# Patient Record
Sex: FEMALE | Race: WHITE | Employment: OTHER | URBAN - NONMETROPOLITAN AREA
[De-identification: names, ages, dates, MRNs, and addresses within clinical notes are randomized per-mention and may not be internally consistent; named-entity substitution may affect disease eponyms.]

---

## 2018-04-11 ENCOUNTER — OFFICE VISIT CONVERTED (OUTPATIENT)
Dept: FAMILY MEDICINE CLINIC | Age: 58
End: 2018-04-11
Attending: NURSE PRACTITIONER

## 2018-04-12 LAB
ALBUMIN SERPL-MCNC: 5 G/DL
ALBUMIN/GLOB SERPL: 1.9 {RATIO}
ALP SERPL-CCNC: 85 IU/L
ALT SERPL-CCNC: 24 IU/L
AST SERPL-CCNC: 28 IU/L
BILIRUB SERPL-MCNC: 0.3 MG/DL
BUN SERPL-MCNC: 11 MG/DL
BUN/CREAT SERPL: 13
CALCIUM SERPL-MCNC: 10 MG/DL
CHLORIDE SERPL-SCNC: 95 MMOL/L
CHOLEST SERPL-MCNC: 255 MG/DL
CO2 SERPL-SCNC: 23 MMOL/L
CONV TOTAL PROTEIN: 7.7 G/DL
CREAT UR-MCNC: 0.88 MG/DL
GLOBULIN UR ELPH-MCNC: 2.7 G/DL
GLUCOSE SERPL-MCNC: 103 MG/DL
HDLC SERPL-MCNC: 73 MG/DL
LDLC SERPL CALC-MCNC: 149 MG/DL
POTASSIUM SERPL-SCNC: 3.7 MMOL/L
SODIUM SERPL-SCNC: 139 MMOL/L
TRIGL SERPL-MCNC: 165 MG/DL
TSH SERPL-ACNC: 2.09 UIU/ML
VLDLC SERPL-MCNC: 33 MG/DL

## 2018-07-16 ENCOUNTER — OFFICE VISIT CONVERTED (OUTPATIENT)
Dept: FAMILY MEDICINE CLINIC | Age: 58
End: 2018-07-16
Attending: NURSE PRACTITIONER

## 2018-07-17 LAB
ALBUMIN SERPL-MCNC: 4.5 G/DL
ALBUMIN/GLOB SERPL: 1.8 {RATIO}
ALP SERPL-CCNC: 79 IU/L
ALT SERPL-CCNC: 15 IU/L
AST SERPL-CCNC: 19 IU/L
BILIRUB SERPL-MCNC: <0.2 MG/DL
BUN SERPL-MCNC: 10 MG/DL
BUN/CREAT SERPL: 12
CALCIUM SERPL-MCNC: 9.8 MG/DL
CHLORIDE SERPL-SCNC: 99 MMOL/L
CHOLEST SERPL-MCNC: 179 MG/DL
CO2 SERPL-SCNC: 25 MMOL/L
CONV TOTAL PROTEIN: 7 G/DL
CREAT UR-MCNC: 0.86 MG/DL
GLOBULIN UR ELPH-MCNC: 2.5 G/DL
GLUCOSE SERPL-MCNC: 97 MG/DL
HDLC SERPL-MCNC: 64 MG/DL
LDLC SERPL CALC-MCNC: 95 MG/DL
POTASSIUM SERPL-SCNC: 3.7 MMOL/L
SODIUM SERPL-SCNC: 140 MMOL/L
TRIGL SERPL-MCNC: 101 MG/DL
TSH SERPL-ACNC: 0.68 UIU/ML
VLDLC SERPL-MCNC: 20 MG/DL

## 2019-06-06 ENCOUNTER — OFFICE VISIT CONVERTED (OUTPATIENT)
Dept: FAMILY MEDICINE CLINIC | Age: 59
End: 2019-06-06
Attending: NURSE PRACTITIONER

## 2019-06-07 LAB
ALBUMIN SERPL-MCNC: 4.7 G/DL
ALBUMIN/GLOB SERPL: 1.7 {RATIO}
ALP SERPL-CCNC: 70 IU/L
ALT SERPL-CCNC: 13 IU/L
AST SERPL-CCNC: 19 IU/L
BILIRUB SERPL-MCNC: 0.3 MG/DL
BUN SERPL-MCNC: 13 MG/DL
BUN/CREAT SERPL: 14
CALCIUM SERPL-MCNC: 9.6 MG/DL
CHLORIDE SERPL-SCNC: 104 MMOL/L
CHOLEST SERPL-MCNC: 189 MG/DL
CO2 SERPL-SCNC: 23 MMOL/L
CONV TOTAL PROTEIN: 7.5 G/DL
CREAT UR-MCNC: 0.91 MG/DL
GLOBULIN UR ELPH-MCNC: 2.8 G/DL
GLUCOSE SERPL-MCNC: 103 MG/DL
HDLC SERPL-MCNC: 59 MG/DL
LDLC SERPL CALC-MCNC: 101 MG/DL
POTASSIUM SERPL-SCNC: 5.1 MMOL/L
SODIUM SERPL-SCNC: 140 MMOL/L
TRIGL SERPL-MCNC: 147 MG/DL
TSH SERPL-ACNC: 0.43 UIU/ML
VLDLC SERPL-MCNC: 29 MG/DL

## 2019-09-11 ENCOUNTER — CONVERSION ENCOUNTER (OUTPATIENT)
Dept: FAMILY MEDICINE CLINIC | Age: 59
End: 2019-09-11

## 2019-09-12 LAB — TSH SERPL-ACNC: 5.62 UIU/ML

## 2019-12-20 ENCOUNTER — CONVERSION ENCOUNTER (OUTPATIENT)
Dept: FAMILY MEDICINE CLINIC | Age: 59
End: 2019-12-20

## 2019-12-21 LAB — TSH SERPL-ACNC: 0.48 UIU/ML

## 2020-01-09 ENCOUNTER — OFFICE VISIT CONVERTED (OUTPATIENT)
Dept: FAMILY MEDICINE CLINIC | Age: 60
End: 2020-01-09
Attending: NURSE PRACTITIONER

## 2020-01-11 LAB
ALBUMIN SERPL-MCNC: 4.9 G/DL
ALBUMIN/GLOB SERPL: 2 {RATIO}
ALP SERPL-CCNC: 79 IU/L
ALT SERPL-CCNC: 15 IU/L
AST SERPL-CCNC: 17 IU/L
BILIRUB SERPL-MCNC: 0.3 MG/DL
BUN SERPL-MCNC: 17 MG/DL
BUN/CREAT SERPL: 19
CALCIUM SERPL-MCNC: 10 MG/DL
CHLORIDE SERPL-SCNC: 102 MMOL/L
CHOLEST SERPL-MCNC: 199 MG/DL
CO2 SERPL-SCNC: 25 MMOL/L
CONV TOTAL PROTEIN: 7.4 G/DL
CREAT UR-MCNC: 0.91 MG/DL
GLOBULIN UR ELPH-MCNC: 2.5 G/DL
GLUCOSE SERPL-MCNC: 105 MG/DL
HDLC SERPL-MCNC: 65 MG/DL
LDLC SERPL CALC-MCNC: 111 MG/DL
POTASSIUM SERPL-SCNC: 4.5 MMOL/L
SODIUM SERPL-SCNC: 140 MMOL/L
TRIGL SERPL-MCNC: 117 MG/DL
VLDLC SERPL-MCNC: 23 MG/DL

## 2020-05-15 ENCOUNTER — CONVERSION ENCOUNTER (OUTPATIENT)
Dept: FAMILY MEDICINE CLINIC | Age: 60
End: 2020-05-15

## 2020-05-16 LAB — TSH SERPL-ACNC: 0.94 UIU/ML

## 2020-07-09 ENCOUNTER — OFFICE VISIT CONVERTED (OUTPATIENT)
Dept: FAMILY MEDICINE CLINIC | Age: 60
End: 2020-07-09
Attending: NURSE PRACTITIONER

## 2020-07-10 LAB
ALBUMIN SERPL-MCNC: 4.5 G/DL
ALBUMIN/GLOB SERPL: 1.8 {RATIO}
ALP SERPL-CCNC: 81 IU/L
ALT SERPL-CCNC: 19 IU/L
AST SERPL-CCNC: 20 IU/L
BILIRUB SERPL-MCNC: <0.2 MG/DL
BUN SERPL-MCNC: 11 MG/DL
BUN/CREAT SERPL: 13
CALCIUM SERPL-MCNC: 9.8 MG/DL
CHLORIDE SERPL-SCNC: 100 MMOL/L
CHOLEST SERPL-MCNC: 208 MG/DL
CO2 SERPL-SCNC: 26 MMOL/L
CONV TOTAL PROTEIN: 7 G/DL
CREAT UR-MCNC: 0.86 MG/DL
GLOBULIN UR ELPH-MCNC: 2.5 G/DL
GLUCOSE SERPL-MCNC: 102 MG/DL
HDLC SERPL-MCNC: 76 MG/DL
LDLC SERPL CALC-MCNC: 109 MG/DL
POTASSIUM SERPL-SCNC: 4.4 MMOL/L
SODIUM SERPL-SCNC: 140 MMOL/L
TRIGL SERPL-MCNC: 117 MG/DL
TSH SERPL-ACNC: 0.46 UIU/ML
VLDLC SERPL-MCNC: 23 MG/DL

## 2020-08-26 ENCOUNTER — OFFICE VISIT CONVERTED (OUTPATIENT)
Dept: OTOLARYNGOLOGY | Facility: CLINIC | Age: 60
End: 2020-08-26
Attending: OTOLARYNGOLOGY

## 2020-10-21 ENCOUNTER — OFFICE VISIT CONVERTED (OUTPATIENT)
Dept: OTOLARYNGOLOGY | Facility: CLINIC | Age: 60
End: 2020-10-21
Attending: OTOLARYNGOLOGY

## 2021-01-04 ENCOUNTER — OFFICE VISIT CONVERTED (OUTPATIENT)
Dept: FAMILY MEDICINE CLINIC | Age: 61
End: 2021-01-04
Attending: NURSE PRACTITIONER

## 2021-01-05 LAB
ALBUMIN SERPL-MCNC: 4.8 G/DL
ALBUMIN/GLOB SERPL: 1.7 {RATIO}
ALP SERPL-CCNC: 84 IU/L
ALT SERPL-CCNC: 29 IU/L
AST SERPL-CCNC: 28 IU/L
BILIRUB SERPL-MCNC: 0.2 MG/DL
BUN SERPL-MCNC: 14 MG/DL
BUN/CREAT SERPL: 16
CALCIUM SERPL-MCNC: 9.7 MG/DL
CHLORIDE SERPL-SCNC: 103 MMOL/L
CHOLEST SERPL-MCNC: 210 MG/DL
CO2 SERPL-SCNC: 24 MMOL/L
CONV TOTAL PROTEIN: 7.6 G/DL
CREAT UR-MCNC: 0.9 MG/DL
GLOBULIN UR ELPH-MCNC: 2.8 G/DL
GLUCOSE SERPL-MCNC: 94 MG/DL
HDLC SERPL-MCNC: 75 MG/DL
LDLC SERPL CALC-MCNC: 113 MG/DL
POTASSIUM SERPL-SCNC: 3.9 MMOL/L
SODIUM SERPL-SCNC: 141 MMOL/L
TRIGL SERPL-MCNC: 128 MG/DL
TSH SERPL-ACNC: 0.85 UIU/ML
VLDLC SERPL-MCNC: 22 MG/DL

## 2021-01-12 ENCOUNTER — HOSPITAL ENCOUNTER (OUTPATIENT)
Dept: OTHER | Facility: HOSPITAL | Age: 61
Discharge: HOME | End: 2021-01-12
Attending: NURSE PRACTITIONER

## 2021-01-12 ENCOUNTER — OFFICE VISIT CONVERTED (OUTPATIENT)
Dept: FAMILY MEDICINE CLINIC | Age: 61
End: 2021-01-12
Attending: NURSE PRACTITIONER

## 2021-01-18 LAB
CONV LAST MENSTURAL PERIOD: NORMAL
SPECIMEN SOURCE: NORMAL
SPECIMEN SOURCE: NORMAL
THIN PREP CVX: NORMAL

## 2021-05-10 NOTE — H&P
History and Physical      Patient Name: Judith Fitzpatrick   Patient ID: [id number]   Sex: Female   YOB: 1960    Primary Care Provider: Linette JO   Referring Provider: Linette JO    Visit Date: August 26, 2020    Provider: Vincenzo Florez MD   Location: ENT - [facility]   Location Address: [address] [place] [address]  [address] [place] [address]  [id number]   Location Phone: [phone]          Chief Complaint     1.  Left ear tinnitus    2.  Hearing loss    3.  Tobacco abuse       History Of Present Illness  Judith Fitzpatrick is a 60 year old /White female who presents to the office today as a consult from Linette JO.      She presents the clinic today for evaluation of issues with her hearing and left ear ringing.  She informs me that she has had high-pitched ear ringing for quite some time, but for the last several months has noted a machinelike ringing that is louder in the left ear.  She denies any other symptoms and has not had any imbalance, vertigo, or significant difficulty in hearing in day-to-day situations.  She denies any family history of hearing loss and has not had any loud noise exposure.    She does smoke about 1 pack/day, and has been a lifelong smoker.  She denies any throat symptoms today.       Past Medical History  Hyperlipidemia; Hypothyroidism; Tinnitus of left ear         Past Surgical History  Dilation and Curettage; Tubal ligation; Uterine ablation         Medication List  Crestor 10 mg oral tablet; hydrochlorothiazide 12.5 mg oral tablet; Levoxyl 100 mcg oral tablet         Allergy List  Lipitor         Family Medical History  Family history of stroke; Family history of heart disease; Family history of diabetes mellitus (DM); Family history of coronary artery disease         Social History  Tobacco (Current every day)         Review of Systems  · Constitutional  o Denies  o : fever, night sweats, weight  "loss  · Eyes  o Denies  o : discharge from eye, impaired vision  · HENT  o Admits  o : *See HPI  · Cardiovascular  o Denies  o : chest pain, irregular heart beats  · Respiratory  o Denies  o : shortness of breath, wheezing, coughing up blood  · Gastrointestinal  o Denies  o : heartburn, reflux, vomiting blood  · Genitourinary  o Denies  o : frequency  · Integument  o Denies  o : rash, skin dryness  · Neurologic  o Denies  o : seizures, loss of balance, loss of consciousness, dizziness  · Endocrine  o Denies  o : cold intolerance, heat intolerance  · Heme-Lymph  o Denies  o : easy bleeding, anemia      Vitals  Date Time BP Position Site L\R Cuff Size HR RR TEMP (F) WT  HT  BMI kg/m2 BSA m2 O2 Sat HC       08/26/2020 01:54 PM        98.5 132lbs 8oz 5'  5\" 22.05 1.66           Physical Examination  · Constitutional  o Appearance  o : well developed, well-nourished, alert and in no acute distress, voice clear and strong  · Head and Face  o Head  o :   § Inspection  § : no deformities or lesions  o Face  o :   § Inspection  § : No facial lesions; House-Brackmann I/VI bilaterally  § Palpation  § : No TMJ crepitus nor  muscle tenderness bilaterally  · Eyes  o Vision  o :   § Visual Fields  § : Extraocular movements are intact. No spontaneous or gaze-induced nystagmus.  o Conjunctivae  o : clear  o Sclerae  o : clear  o Pupils and Irises  o : pupils equal, round, and reactive to light.   · Ears, Nose, Mouth and Throat  o Ears  o :   § External Ears  § : appearance within normal limits, no lesions present  § Otoscopic Examination  § : tympanic membrane appearance within normal limits bilaterally without perforations, well-aerated middle ears  § Hearing  § : intact to conversational voice both ears  o Nose  o :   § External Nose  § : appearance normal  § Intranasal Exam  § : mucosa within normal limits, vestibules normal, no intranasal lesions present, septum midline, sinuses non tender to percussion  o Oral " Cavity  o :   § Oral Mucosa  § : oral mucosa normal without pallor or cyanosis  § Lips  § : lip appearance normal  § Teeth  § : normal dentition for age  § Gums  § : gums pink, non-swollen, no bleeding present  § Tongue  § : tongue appearance normal; normal mobility  § Palate  § : hard palate normal, soft palate appearance normal with symmetric mobility  o Throat  o :   § Oropharynx  § : no inflammation or lesions present, tonsils within normal limits  § Hypopharynx  § : appearance within normal limits, superior epiglottis within normal limits  § Larynx  § : appearance within normal limits, vocal cords within normal limits, no lesions present  · Neck  o Inspection/Palpation  o : normal appearance, no masses or tenderness, trachea midline; thyroid size normal, nontender, no nodules or masses present on palpation  · Respiratory  o Respiratory Effort  o : breathing unlabored  o Inspection of Chest  o : normal appearance, no retractions  · Cardiovascular  o Heart  o : regular rate and rhythm  · Lymphatic  o Neck  o : no lymphadenopathy present  o Supraclavicular Nodes  o : no lymphadenopathy present  o Preauricular Nodes  o : no lymphadenopathy present  · Skin and Subcutaneous Tissue  o General Inspection  o : Regarding face and neck - there are no rashes present, no lesions present, and no areas of discoloration  · Neurologic  o Cranial Nerves  o : cranial nerves II-XII are grossly intact bilaterally  o Gait and Station  o : normal gait, able to stand without diffculty  · Psychiatric  o Judgement and Insight  o : judgment and insight intact  o Mood and Affect  o : mood normal, affect appropriate          Assessment  · Tobacco abuse     305.1/Z72.0  · Hearing loss     389.9/H91.90  · Tinnitus     388.30/H93.19    Problems Reconciled  Plan  · Orders  o Smoking cessation counseling, 3-10 minutes [facility] ([id number]) - 305.1/Z72.0 - 08/26/2020  o Audiometry, pure-tone (threshold); air and bone (31899) - 389.9/H91.90, 388.30/H93.19  - 08/26/2020  o Tympanogram (Impedance Testing) [facility] ([id number]) - 389.9/H91.90, 388.30/H93.19 - 08/26/2020  · Medications  o Medications have been Reconciled  o Transition of Care or Provider Policy  · Instructions  o Tobacco and smoking cessation counseling for more than 3 minutes was completed.  o She presents the clinic today for evaluation of issues with her hearing and left ear ringing. She informs me that she has had high-pitched ear ringing for quite some time, but for the last several months has noted a machinelike ringing that is louder in the left ear. She denies any other symptoms and has not had any imbalance, vertigo, or significant difficulty in hearing in day-to-day situations. She denies any family history of hearing loss and has not had any loud noise exposure.She does smoke about 1 pack/day, and has been a lifelong smoker. She denies any throat symptoms today. On examination today, she has narrow ear canals but the eardrums appear normal on both sides. I will plan on obtaining an audiogram and see her back in 6 weeks to discuss the results and any further management for this. I did have a lengthy discussion with her about the importance of quitting smoking and the relation between smoking and had a neck as well as other kind of cancers and vascular issues. She states that she understands, and will try.  o Electronically Identified Patient Education Materials Provided Electronically  · Correspondence  o ENT Letter to Referring MD (Linette JO) - 08/26/2020            Electronically Signed by: Vincenzo Florez MD -Author on August 26, 2020 02:12:41 PM

## 2021-05-13 NOTE — PROGRESS NOTES
Progress Note      Patient Name: Judith Fitzpatrick   Patient ID: [id number]   Sex: Female   YOB: 1960    Primary Care Provider: Linette JO   Referring Provider: Linette JO    Visit Date: October 21, 2020    Provider: Vincenzo Florez MD   Location: [facility]   Location Address: [address] [place] [address]  [address] [place] [address]  [id number]   Location Phone: [phone]          Chief Complaint     1.  Hearing loss    2.  Tinnitus    3.  Tobacco abuse       History Of Present Illness  Judith Fitzpatrick is a 60 year old /White female who presents to the office today for a follow-up visit.      She presents the clinic today for follow-up regarding hearing loss and tinnitus.  I last saw her for this in August, at which point she was describing a machinelike sound, or whooshing sound that seems to be louder in the left ear.  She has had no significant changes in her symptoms.  She feels like she hears well most of the time in day-to-day situations, but does have some issues.  She did have an audiogram performed which shows normal hearing with mild to moderate sensorineural hearing loss above 1000 Hz which is roughly symmetric in both ears.  Tympanogram shows negative pressure, slight.  Word discrimination scores were 100% on the right and 96% on the left.    She continues to smoke, but is trying to cut down, we discussed this today.       Past Medical History  Hyperlipidemia; Hypothyroidism; Tinnitus of left ear         Past Surgical History  Dilation and Curettage; Tubal ligation; Uterine ablation         Medication List  Crestor 10 mg oral tablet; hydrochlorothiazide 12.5 mg oral tablet; Levoxyl 100 mcg oral tablet         Allergy List  Lipitor       Allergies Reconciled  Family Medical History  Family history of stroke; Family history of heart disease; Family history of diabetes mellitus (DM); Family history of coronary artery disease  "        Social History  Tobacco (Current every day)         Review of Systems  · Constitutional  o Denies  o : fever, night sweats, weight loss  · Eyes  o Denies  o : discharge from eye, impaired vision  · HENT  o Admits  o : *See HPI  · Cardiovascular  o Denies  o : chest pain, irregular heart beats  · Respiratory  o Denies  o : shortness of breath, wheezing, coughing up blood  · Gastrointestinal  o Denies  o : heartburn, reflux, vomiting blood  · Genitourinary  o Denies  o : frequency  · Integument  o Denies  o : rash, skin dryness  · Neurologic  o Denies  o : seizures, loss of balance, loss of consciousness, dizziness  · Endocrine  o Denies  o : cold intolerance, heat intolerance  · Heme-Lymph  o Denies  o : easy bleeding, anemia      Vitals  Date Time BP Position Site L\R Cuff Size HR RR TEMP (F) WT  HT  BMI kg/m2 BSA m2 O2 Sat FR L/min FiO2        10/21/2020 09:27 AM        97 135lbs 0oz 5'  5\" 22.46 1.68             Physical Examination  · Constitutional  o Appearance  o : well developed, well-nourished, alert and in no acute distress, voice clear and strong  · Head and Face  o Head  o :   § Inspection  § : no deformities or lesions  o Face  o :   § Inspection  § : No facial lesions; House-Brackmann I/VI bilaterally  § Palpation  § : No TMJ crepitus nor  muscle tenderness bilaterally  · Eyes  o Vision  o :   § Visual Fields  § : Extraocular movements are intact. No spontaneous or gaze-induced nystagmus.  o Conjunctivae  o : clear  o Sclerae  o : clear  o Pupils and Irises  o : pupils equal, round, and reactive to light.   · Ears, Nose, Mouth and Throat  o Ears  o :   § External Ears  § : appearance within normal limits, no lesions present  § Otoscopic Examination  § : tympanic membrane appearance within normal limits bilaterally without perforations, well-aerated middle ears  § Hearing  § : intact to conversational voice both ears  o Nose  o :   § External Nose  § : appearance normal  § Intranasal " Exam  § : mucosa within normal limits, vestibules normal, no intranasal lesions present, septum midline, sinuses non tender to percussion  o Oral Cavity  o :   § Oral Mucosa  § : oral mucosa normal without pallor or cyanosis  § Lips  § : lip appearance normal  § Teeth  § : normal dentition for age  § Gums  § : gums pink, non-swollen, no bleeding present  § Tongue  § : tongue appearance normal; normal mobility  § Palate  § : hard palate normal, soft palate appearance normal with symmetric mobility  o Throat  o :   § Oropharynx  § : no inflammation or lesions present, tonsils within normal limits  § Hypopharynx  § : appearance within normal limits, superior epiglottis within normal limits  § Larynx  § : appearance within normal limits, vocal cords within normal limits, no lesions present  · Neck  o Inspection/Palpation  o : normal appearance, no masses or tenderness, trachea midline; thyroid size normal, nontender, no nodules or masses present on palpation  · Respiratory  o Respiratory Effort  o : breathing unlabored  o Inspection of Chest  o : normal appearance, no retractions  · Cardiovascular  o Heart  o : regular rate and rhythm  · Lymphatic  o Neck  o : no lymphadenopathy present  o Supraclavicular Nodes  o : no lymphadenopathy present  o Preauricular Nodes  o : no lymphadenopathy present  · Skin and Subcutaneous Tissue  o General Inspection  o : Regarding face and neck - there are no rashes present, no lesions present, and no areas of discoloration  · Neurologic  o Cranial Nerves  o : cranial nerves II-XII are grossly intact bilaterally  o Gait and Station  o : normal gait, able to stand without diffculty  · Psychiatric  o Judgement and Insight  o : judgment and insight intact  o Mood and Affect  o : mood normal, affect appropriate          Assessment  · Tobacco abuse     305.1/Z72.0  · Hearing loss     389.9/H91.90  · Tinnitus     388.30/H93.19      Plan  · Orders  o Smoking cessation counseling, 3-10 minutes  [facility] ([id number]) - 305.1/Z72.0 - 10/21/2020  o Audiometry, pure-tone (threshold); air and bone (22150) - 389.9/H91.90, 388.30/H93.19 - 10/21/2021  o Tympanogram (Impedance Testing) [facility] ([id number]) - 389.9/H91.90, 388.30/H93.19 - 10/21/2021  · Medications  o Medications have been Reconciled  o Transition of Care or Provider Policy  · Instructions  o Tobacco and smoking cessation counseling for more than 3 minutes was completed.  o She presents the clinic today for follow-up regarding hearing loss and tinnitus. I last saw her for this in August, at which point she was describing a machinelike sound, or whooshing sound that seems to be louder in the left ear. She has had no significant changes in her symptoms. She feels like she hears well most of the time in day-to-day situations, but does have some issues. She did have an audiogram performed which shows normal hearing with mild to moderate sensorineural hearing loss above 1000 Hz which is roughly symmetric in both ears. Tympanogram shows negative pressure, slight. Word discrimination scores were 100% on the right and 96% on the left. On examination, her ears appear stable, normal. We discussed treatment options including melatonin 3 mg nightly, as well as background noise distraction techniques. I would like to retest her hearing in 1 year, or sooner should there be any issues.She continues to smoke, but is trying to cut down, we discussed this today. I will see her back in 1 year after the audiogram, or sooner if she has any changes in her symptoms.   o Electronically Identified Patient Education Materials Provided Electronically  · Correspondence  o ENT Letter to Referring MD (Linette JO) - 10/21/2020            Electronically Signed by: Vincenzo Florez MD -Author on October 21, 2020 09:37:33 AM

## 2021-05-14 VITALS — TEMPERATURE: 97 F | WEIGHT: 135 LBS | BODY MASS INDEX: 22.49 KG/M2 | HEIGHT: 65 IN

## 2021-05-14 VITALS — WEIGHT: 132.5 LBS | BODY MASS INDEX: 22.08 KG/M2 | TEMPERATURE: 98.5 F | HEIGHT: 65 IN

## 2021-05-18 NOTE — PROGRESS NOTES
Judith FitzpatrickJoshua 1960     Office/Outpatient Visit    Visit Date: Wed, Apr 11, 2018 08:47 am    Provider: Linette Tracey N.P. (Assistant: Romana Jane MA)    Location: [facility]        Electronically signed by Linette Tracey N.P. on  04/11/2018 12:06:48 PM                             SUBJECTIVE:        CC:     Ms. Fitzpatrick is a 57 year old White female.  This is a follow-up visit.  med refills;         HPI:         Ms. Fitzpatrick presents with hypertension.  Her current cardiac medication regimen includes a diuretic ( hctz ).  Ms. Fitzpatrick does not check her blood pressure other than at her clinic appointments.  Compliance with treatment has been good; she takes her medication as directed and follows up as directed.          In regard to the high cholesterol, current treatment includes Pravachol.  Compliance with treatment has been good; she takes her medication as directed and follows up as directed.  Most recent lab tests include Total Cholesterol:  226 (mg/dL) (11/27/2017), HDL:  59 (mg/dL) (11/27/2017), Triglycerides:  224 (mg/dL) (11/27/2017), LDL Cholesterol:  122 (mg/dL) (11/27/2017).          Additionally, she presents with history of acquired hypothyroidism, other specified cause.  she is currently taking Levoxyl, 10 mcg daily.  TSH was last checked 5 months ago.  The result was reported as normal ( 1.87 mU/L ).      ROS:     CONSTITUTIONAL:  Negative for chills, fatigue, fever, and weight change.      CARDIOVASCULAR:  Negative for chest pain, palpitations, tachycardia, orthopnea, and edema.      RESPIRATORY:  Negative for cough, dyspnea, and hemoptysis.      INTEGUMENTARY/BREAST:  Positive for suspicous mole (  states she has some abnormal areas on back ) and skin tags.      NEUROLOGICAL:  Negative for dizziness, headaches, paresthesias, and weakness.          PMH/FMH/SH:     Last Reviewed on 4/11/2018 09:23 AM by Linette Tracey    Past Medical History:             GYNECOLOGICAL  HISTORY:             PREVENTIVE HEALTH MAINTENANCE             Hepatitis C Medicare Screening: was last done 17; negative         Surgical History:     Surgeries:    Biopsy of breast; benign;       Dilation and Curettage    Bilateral Tubal Ligation    BENIGN TUMOR REMOVED FROM NECK;    uterine ablation;     Procedures:    Colonoscopy (  ) heart cath normal 14         Family History:         Positive for Coronary Artery Disease and Hypertension.      Positive for Type 2 Diabetes and Hypothyroidism.  Father:  at age 70's; Cause of death was stomach cancer;  Hypertension;  Type 2 Diabetes     Mother: Hypertension; Hyperlipidemia         Social History:     Occupation: Retired (Prior occupation: [employer] )     Marital Status:      Children: 2 children, 3 step-children, and (2 grandchildren) step children         Tobacco/Alcohol/Supplements:     Last Reviewed on 2018 09:23 AM by Linette Tracey    Tobacco: Current Smoker: She currently smokes every day, 1 pack per day.  Non-drinker     Caffeine:  She admits to consuming caffeine via coffee ( 10 servings per day ).              Immunizations:     zzFluzone pf-quadrivalent 3 and up 2015     Fluzone (3 + years dose) 2010     Adacel (Tdap) 2009         Allergies:     Last Reviewed on 2018 09:23 AM by Linette Tracey      No Known Drug Allergies.     Lipitor: nausea (Adverse Reaction)        Current Medications:     Last Reviewed on 2018 09:23 AM by Linette Tracey    Hydrochlorothiazide (HCTZ) 25mg Tablet one a day     Levoxyl 0.1mg Tablet Take 1 tablet(s) by mouth daily     Pravachol 40mg Tablet take 1 tablet daily at bedtime         OBJECTIVE:        Vitals:         Current: 2018 8:49:31 AM    Ht:  5 ft, 5 in;  Wt: 132.7 lbs;  BMI: 22.1    T: 97.6 F (oral);  BP: 147/100 mm Hg (left arm, sitting);  P: 95 bpm (left arm (BP Cuff), sitting);  sCr: 0.75 mg/dL;  GFR: 78.84        Repeat:     8:52:14  AM     BP:   138/96mm Hg (left arm, sitting)     9:33:08 AM     BP:   154/98mm Hg (left arm, sitting)         Exams:     PHYSICAL EXAM:     GENERAL: vital signs recorded - well developed, well nourished;  no apparent distress;     NECK: carotid exam reveals no bruits;     RESPIRATORY: normal respiratory rate and pattern with no distress; normal breath sounds with no rales, rhonchi, wheezes or rubs;     CARDIOVASCULAR: normal rate; rhythm is regular;  no systolic murmur; no edema;     BREAST/INTEGUMENT: seborrheic keratoses; two skin tags on right lower back; multiple moles on back;     PSYCHIATRIC:  appropriate affect and demeanor; normal speech pattern; grossly normal memory;         ASSESSMENT           401.1   I10  Hypertension              DDx:     272.0   E78.2  High cholesterol              DDx:     244.8   E03.8  Acquired hypothyroidism, other specified cause              DDx:     701.9   L91.8  Skin tag              DDx:         ORDERS:         Meds Prescribed:       Refill of: Hydrochlorothiazide (HCTZ) 25mg Tablet one a day  #90 (Ninety) tablet(s) Refills: 1       Refill of: Levoxyl (Levothyroxine Sodium) 0.1mg Tablet Take 1 tablet(s) by mouth daily  #90 (Ninety) tablet(s) Refills: 1         Lab Orders:       [id number]  [id number] [facility] Comp Metabolic Panel (14)  (Send-Out)         [id number]  [id number] [facility] Lipid Panel (Excludes LDL/HDL ratio)  (Send-Out)         [id number]  [id number] [facility] TSH  (Send-Out)           Procedures Ordered:       REFER  Referral to Specialist or Other Facility  (Send-Out)                   PLAN:          Hypertension have her bp rechecked     LABORATORY:  Labs ordered to be performed today at [facility] include.  CMP     RECOMMENDATIONS given include: perform routine monitoring of blood pressure with home blood pressure cuff, exercise, and smoking cessation.      FOLLOW-UP: Instructed to call if new or worsening symptoms develop. Schedule a follow-up visit in 6 months.            Prescriptions:        Refill of: Hydrochlorothiazide (HCTZ) 25mg Tablet one a day  #90 (Ninety) tablet(s) Refills: 1           Orders:       [id number]  [id number] [facility] Comp Metabolic Panel (14)  (Send-Out)            High cholesterol Reviewed labs and discussed possibility of changing to a different anti-lipid if lipid panel is still elevated.     LABORATORY:  Labs ordered to be performed today at [facility] include.  Lipid panel     RECOMMENDATIONS given include: smoking cessation, exercise, and low cholesterol/low fat diet.      FOLLOW-UP: Schedule a follow-up visit in 6 months.            Orders:       [id number]  [id number] [facility] Lipid Panel (Excludes LDL/HDL ratio)  (Send-Out)            Acquired hypothyroidism, other specified cause Reviewed labs     LABORATORY:  Labs ordered to be performed today at [facility] include.  TSH     FOLLOW-UP: Schedule a follow-up visit in 6 months.            Prescriptions:       Refill of: Levoxyl (Levothyroxine Sodium) 0.1mg Tablet Take 1 tablet(s) by mouth daily  #90 (Ninety) tablet(s) Refills: 1           Orders:       [id number]  [id number] [facility] TSH  (Send-Out)            Skin tag Will refer to Dr Farooq with dermatology to do a head to toe skin check. Educated pt on importance of yearly mammogram and pap smear. Instructed to call and schedule a well woman exam if desired.         REFERRALS:  Referral initiated to a dermatologist ( Dr. Patricia Farooq; for evaluation of skin evaluation ).            Orders:       REFER  Referral to Specialist or Other Facility  (Send-Out)               Patient Recommendations:        For  Hypertension:     Begin monitoring your blood pressure by brief nurse visits at our office, a home blood pressure monitor, or by checking on the machines in pharmacies or stores.  Keep a log of the readings. Maintain a regular exercise program. Stop smoking!  Call the office if you develop adverse reactions to your medication, such as dizziness, fainting, swelling in the legs, extreme fatigue, or  any other symptoms that concern you. Schedule a follow-up visit in 6 months.          For  High cholesterol:     Stop smoking! Maintain a regular exercise program. Reduce the amount of cholesterol and saturated fat in your diet.  Schedule a follow-up visit in 6 months.          For  Acquired hypothyroidism, other specified cause:     Schedule a follow-up visit in 6 months.              CHARGE CAPTURE           **Please note: ICD descriptions below are intended for billing purposes only and may not represent clinical diagnoses**        Primary Diagnosis:         401.1 Hypertension            I10    Essential (primary) hypertension              Orders:          00890   Office/outpatient visit; established patient, level 4  (In-House)           272.0 High cholesterol            E78.2    Mixed hyperlipidemia    244.8 Acquired hypothyroidism, other specified cause            E03.8    Other specified hypothyroidism    701.9 Skin tag            L91.8    Other hypertrophic disorders of the skin

## 2021-05-18 NOTE — PROGRESS NOTES
Judith Fitzpatrick  1960     Office/Outpatient Visit    Visit Date:  01:01 pm    Provider: Linette Tracey N.P. (Assistant: Sophie Holland MA)    Location: [facility]        Electronically signed by Linette Tracey N.P. on  2021 03:33:06 PM                             Subjective:        CC: Ms. Fitzpatrick is a 60 year old White female.  She presents with physical.  She is here for an annual exam.          HPI:           Dx with encounter for gynecological examination (general) (routine) without abnormal findings; she cannot recall when she last had a physical exam.  She is menopausal.  She does not perform breast self-exams.   Her last Pap smear was several years ago and was abnormal, but no details are known.   Her last mammogram was 2 years ago.   Preventative Health updated today.            PHQ-9 Depression Screening: Completed form scanned and in chart; Total Score 0     ROS:     CONSTITUTIONAL:  Negative for chills and fever.      EYES:  Negative for blurred vision.      E/N/T:  Negative for diminished hearing and nasal congestion.      CARDIOVASCULAR:  Negative for chest pain and palpitations.      RESPIRATORY:  Negative for recent cough and dyspnea.      GASTROINTESTINAL:  Negative for abdominal pain, melena, nausea and vomiting.      GENITOURINARY:  Positive for pelvic pain / intermittently, not associated with intercourse.   Negative for dysuria or hematuria.      MUSCULOSKELETAL:  Negative for arthralgias and myalgias.      INTEGUMENTARY/BREAST:  Negative for atypical mole(s) and rash.      NEUROLOGICAL:  Negative for paresthesias and weakness.      PSYCHIATRIC:  Negative for anxiety and depression.          Past Medical History / Family History / Social History:         Last Reviewed on 2021 09:32 AM by Linette Tracey    Past Medical History:             GYNECOLOGICAL HISTORY:             PREVENTIVE HEALTH MAINTENANCE             Hepatitis C  Medicare Screening: was last done 17; negative         Surgical History:     Surgeries:    Biopsy of breast; benign;      Dilation and Curettage    Bilateral Tubal Ligation    BENIGN TUMOR REMOVED FROM NECK;    uterine ablation;     Procedures:    Colonoscopy (  ) heart cath normal 14         Family History:         Positive for Coronary Artery Disease and Hypertension.      Positive for Type 2 Diabetes and Hypothyroidism.  Father:  at age 70's; Cause of death was stomach cancer;  Hypertension;  Type 2 Diabetes     Mother: Hypertension; Hyperlipidemia         Social History:     Occupation: Retired (Prior occupation: [employer] )     Marital Status:      Children: 2 children, 3 step-children, and (2 grandchildren) step children (living with her)         Tobacco/Alcohol/Supplements:     Last Reviewed on 2021 08:53 AM by nAgelita Higuera    Tobacco: Current Smoker: She currently smokes every day, 1/2 to 1 pack per day.  Non-drinker     Caffeine:  She admits to consuming caffeine via coffee ( 10 servings per day ).          Immunizations:     influenza, injectable, quadrivalent, preservative free (FLUZONE QUAD 7474-7772) 2021    zzFluzone pf-quadrivalent 3 and up 2015    Fluzone (3 + years dose) 2010    Adacel (Tdap) 2009        Allergies:     Last Reviewed on 2021 01:08 PM by Sophie Holland    Lipitor: Nausea  (Adverse Reaction)        Current Medications:     Last Reviewed on 2021 01:08 PM by Sophie Holland    Levoxyl 100 mcg oral tablet [Take 1 tablet(s) by mouth daily]    hydroCHLOROthiazide 12.5 mg oral tablet [Take 1 tablet by mouth once daily]    Crestor 10 mg oral tablet [Take 1 tablet(s) by mouth daily  at bedtime]        Objective:        Vitals:         Current: 2021 1:11:54 PM    Ht:  5 ft, 5 in;  Wt: 138 lbs;  BMI: 23.0T: 97.7 F (temporal);  BP: 148/85 mm Hg (right arm, sitting);  P: 73 bpm (right arm (BP Cuff), sitting);   sCr: 0.9 mg/dL;  GFR: 64.46        Exams:     PHYSICAL EXAM:     GENERAL: vital signs recorded - well developed, well nourished;  no apparent distress;     EYES: extraocular movements intact; conjunctiva and cornea are normal; PERRLA;     E/N/T:  normal EACs, TMs, nasal/oral mucosa, teeth, gingiva, and oropharynx;     NECK: range of motion is normal; thyroid is non-palpable;     RESPIRATORY: normal respiratory rate and pattern with no distress; normal breath sounds with no rales, rhonchi, wheezes or rubs;     CARDIOVASCULAR: normal rate; rhythm is regular;  no systolic murmur; no edema;     GASTROINTESTINAL: nontender; normal bowel sounds; no organomegaly; rectal exam: normal tone; nontender, guaiac negative stool;     GENITOURINARY: Pap smear taken;  external genitalia: normal without lesions or urethral abnormalities;; vagina: atrophic mucosa; ;  cervix: normal appearance without lesions or discharge;;  uterus: normal size and position, well-supported;;  adnexa: normal with no masses or tenderness     LYMPHATIC: no enlargement of cervical or facial nodes; no axillary adenopathy;     BREAST/INTEGUMENT: BREASTS: breast exam is normal without masses, skin changes, or nipple discharge; SKIN: no significant rashes or lesions; no suspicious moles;     MUSCULOSKELETAL:  Normal range of motion, strength and tone;     NEUROLOGIC: GROSSLY INTACT     PSYCHIATRIC:  appropriate affect and demeanor; normal speech pattern; grossly normal memory;         Lab/Test Results:         Glucose, Urine: Neg (01/12/2021),     Bilirubin, urine: Negative (01/12/2021),     Ketones, Urine Strip: Negative (01/12/2021),     Specific Gravity, urine: 1.015 (01/12/2021),     Blood in Urine: negative (01/12/2021),     pH, urine: 7.0 (01/12/2021),     Protein Urine QL: negative (01/12/2021),     Urobilinogen, urine: 0.2 E.U./dL (01/12/2021),     Nitrite, Urine: Negative (01/12/2021),     Leukoctyes, urine: Small (01/12/2021),     Appearance:  Slightly Cloudy (01/12/2021),     collection source: Clean-catch (01/12/2021),     Color: Yellow (01/12/2021),     Performed by:: [email or web address] (01/12/2021),             Assessment:         Z01.419   Encounter for gynecological examination (general) (routine) without abnormal findings       Z13.31   Encounter for screening for depression       Z12.12   Encounter for screening for malignant neoplasm of rectum       I10   Essential (primary) hypertension           ORDERS:         Radiology/Test Orders:       [id number]  Screening digital breast tomosynthesis bi  (Send-Out)              Lab Orders:       [id number]  Urinalysis, automated, without microscopy  (In-House)            92445  Occult blood, fecal  (In-House)              Procedures Ordered:       [id number]  Handlg&/or convey of spec for TR office to lab  (In-House)            63349  Cytopathology, cervix/vagina collect in preservative, auto thin layer prep, rakesh w/MD supervision  (Send-Out)              Other Orders:         Depression screen negative  (In-House)                      Plan:         Encounter for gynecological examination (general) (routine) without abnormal findingsreviewed pap from 5-, normal /urine dip okay        RADIOLOGY:  I have ordered Mammogram Bilateral Screening 3D to be done today.      COUNSELING was provided today regarding the following topics: breast self-exam.      FOLLOW-UP: pending pap smear results           Orders:       60174  Handlg&/or convey of spec for TR office to lab  (In-House)            11172  Urinalysis, automated, without microscopy  (In-House)            74463  Screening digital breast tomosynthesis bi  (Send-Out)            02483  Cytopathology, cervix/vagina collect in preservative, auto thin layer prep, scrn w/MD supervision  (Send-Out)              Encounter for screening for depression    MIPS PHQ-9 Depression Screening: Completed form scanned and in chart; Total Score 0; Negative Depression Screen           Orders:          Depression screen negative  (In-House)              Encounter for screening for malignant neoplasm of rectumget a copy of last colonoscopy          Orders:       65275  Occult blood, fecal  (In-House)      [place]  [facility] [place]ential (primary) hypertensionreviewed recent labs         RECOMMENDATIONS given include: perform routine monitoring of blood pressure with home blood pressure cuff.              Patient Recommendations:        For  Encounter for gynecological examination (general) (routine) without abnormal findings:        You should regularly examine your breasts, easily done while in the shower or with lotion.  Feel and look for differences in consistency from month to month, especially noting knots or lumps, changes in skin appearance, nipple retraction or discharge.          For  Essential (primary) hypertension:    Begin monitoring your blood pressure by brief nurse visits at our office, a home blood pressure monitor, or by checking on the machines in pharmacies or stores.  Keep a log of the readings.              Charge Capture:         Primary Diagnosis:     Z01.419  Encounter for gynecological examination (general) (routine) without abnormal findings           Orders:      82192  Preventive medicine, established patient, age 40-64 years  (In-House)            27092  Handlg&/or convey of spec for TR office to lab  (In-House)            36514  Urinalysis, automated, without microscopy  (In-House)              Z13.31  Encounter for screening for depression           Orders:        Depression screen negative  (In-House)              Z12.12  Encounter for screening for malignant neoplasm of rectum           Orders:      03957  Occult blood, fecal  (In-House)              I10  Essential (primary) hypertension

## 2021-05-18 NOTE — PROGRESS NOTES
Judith Fitzpatrick  1960     Office/Outpatient Visit    Visit Date: Thu, Jul 9, 2020 09:37 am    Provider: Linette Tracey N.P. (Assistant: Kiara Davalos MA)    Location: [facility]        Electronically signed by Linette Tracey N.P. on  07/09/2020 10:53:31 AM                             Subjective:        CC: Ms. Fitzpatrick is a 60 year old White female.  This is a follow-up visit.  CHECK UP;         HPI:           Patient presents with other specified hypothyroidism.  She is currently taking Levoxyl, 100 mcg daily.  TSH was last checked two months ago.  The result was reported as normal.            Dx with mixed hyperlipidemia; current treatment includes Crestor.  Compliance with treatment has been good; she takes her medication as directed.  She denies experiencing any hypercholesterolemia related symptoms.  Most recent lab tests include Glucose, Serum:  105 (mg/dL) (01/10/2020), ALT (SGPT):  15 (IU/L) (01/10/2020), AST (SGOT):  17 (IU/L) (01/10/2020), Systolic BP:  156 (07/09/2020), Diastolic BP:  92 (07/09/2020), TSH:  0.939 (uIU/mL) (05/15/2020), Total Cholesterol:  199 (mg/dL) (01/10/2020), HDL:  65 (mg/dL) (01/10/2020), Triglycerides:  117 (mg/dL) (01/10/2020), LDL Cholesterol:  111 (mg/dL) (01/10/2020).            Dx with essential (primary) hypertension; her current cardiac medication regimen includes a diuretic ( Hydrochlorothiazide (HCTZ) ).  Ms. Fitzpatrick does not check her blood pressure other than at her clinic appointments.  She is tolerating the medication well without side effects.  Compliance with treatment has been good; she takes her medication as directed.      ROS:     CONSTITUTIONAL:  Negative for fever.      E/N/T:  Positive for tinnitus left.  since Mach 2020    CARDIOVASCULAR:  Negative for chest pain, palpitations, tachycardia, orthopnea, and edema.      RESPIRATORY:  Negative for recent cough and dyspnea.      NEUROLOGICAL:  Negative for dizziness, headaches,  paresthesias, and weakness.          Past Medical History / Family History / Social History:         Last Reviewed on 2020 09:49 AM by Linette Tracey    Past Medical History:             GYNECOLOGICAL HISTORY:             PREVENTIVE HEALTH MAINTENANCE             Hepatitis C Medicare Screening: was last done 17; negative         Surgical History:     Surgeries:    Biopsy of breast; benign; 2004     Dilation and Curettage    Bilateral Tubal Ligation    BENIGN TUMOR REMOVED FROM NECK;    uterine ablation;     Procedures:    Colonoscopy (  ) heart cath normal 14         Family History:         Positive for Coronary Artery Disease and Hypertension.      Positive for Type 2 Diabetes and Hypothyroidism.  Father:  at age 70's; Cause of death was stomach cancer;  Hypertension;  Type 2 Diabetes     Mother: Hypertension; Hyperlipidemia         Social History:     Occupation: Retired (Prior occupation: [employer] )     Marital Status:      Children: 2 children, 3 step-children, and (2 grandchildren) step children (living with her)         Tobacco/Alcohol/Supplements:     Last Reviewed on 2020 11:14 AM by Romana Jane    Tobacco: Current Smoker: She currently smokes every day, 1 pack per day.  Non-drinker     Caffeine:  She admits to consuming caffeine via coffee ( 10 servings per day ).          Immunizations:     zzFluzone pf-quadrivalent 3 and up 2015    Fluzone (3 + years dose) 2010    Adacel (Tdap) 2009        Allergies:     Last Reviewed on 2020 09:43 AM by Kiara Davalos    Lipitor: Nausea  (Adverse Reaction)        Current Medications:     Last Reviewed on 2020 11:14 AM by Romana Jane    Levoxyl 100 mcg oral tablet [Take 1 tablet(s) by mouth daily]    hydroCHLOROthiazide 12.5 mg oral tablet [Take 1 tablet by mouth once daily]    Crestor 10 mg oral tablet [Take 1 tablet(s) by mouth daily  at bedtime]        Objective:        Vitals:          Current: 7/9/2020 9:42:55 AM    Ht:  5 ft, 5 in;  Wt: 133.6 lbs;  BMI: 22.2T: 97.3 F (temporal);  BP: 156/92 mm Hg (left arm, sitting);  P: 65 bpm (left arm (BP Cuff), sitting);  sCr: 0.91 mg/dL;  GFR: 62.88        Repeat:     10:3:35 AM  BP:   163/81mm Hg (left arm, sitting, HR: 61)     Exams:     PHYSICAL EXAM:     GENERAL: vital signs recorded - well developed, well nourished;  no apparent distress;     E/N/T: EARS: external auditory canal occluded by cerumen on the right;  left TM is normal; partial occluded on left    NECK: carotid exam reveals no bruits;     RESPIRATORY: normal respiratory rate and pattern with no distress; normal breath sounds with no rales, rhonchi, wheezes or rubs;     CARDIOVASCULAR: normal rate; rhythm is regular;  no systolic murmur; no edema;     PSYCHIATRIC:  appropriate affect and demeanor; normal speech pattern; grossly normal memory;         Assessment:         E03.8   Other specified hypothyroidism       E78.2   Mixed hyperlipidemia       I10   Essential (primary) hypertension       H93.12   Tinnitus, left ear           ORDERS:         Meds Prescribed:       [Refilled] Levoxyl 100 mcg oral tablet [Take 1 tablet(s) by mouth daily], #90 (ninety) tablets, Refills: 0 (zero)       [Refilled] Crestor 10 mg oral tablet [Take 1 tablet(s) by mouth daily  at bedtime], #90 (ninety) tablets, Refills: 1 (one)       [Refilled] hydroCHLOROthiazide 12.5 mg oral tablet [Take 1 tablet by mouth once daily], #90 (ninety) tablets, Refills: 0 (zero)         Lab Orders:       [id number]  [id number] [facility] TSH  (Send-Out)            [id number] [facility] Comp Metabolic Panel (14)  (Send-Out)            [id number]  [id number] [facility] Lipid Panel (Excludes LDL/HDL ratio)  (Send-Out)              Procedures Ordered:       REFER  Referral to Specialist or Other Facility  (Send-Out)                      Plan:         Other specified hypothyroidismhad coffee with cream and sugar this am/she [facility]    LABORATORY:  Labs ordered to be performed today at [facility] include.  TSH           Prescriptions:       [Refilled] Levoxyl 100 mcg oral tablet [Take 1 tablet(s) by mouth daily], #90 (ninety) tablets, Refills: 0 (zero)           Orders:       [id number]  [id number] [facility]  ([facility])              Mixed hyperlipidemia    LABORATORY:  Labs ordered to be performed today at [facility] include.  CMP Lipid panel           Prescriptions:       [Refilled] Crestor 10 mg oral tablet [Take 1 tablet(s) by mouth daily  at bedtime], #90 (ninety) tablets, Refills: 1 (one)           Orders:       [id number]  [id number] [facility] Comp Metabolic Panel (14)  (Send-Out)            [id number]  [id number] [facility] Lipid Panel (Excludes LDL/HDL ratio)  (Send-Out)              Essential (primary) hypertensionhave her bp rechecked, may need a higher dose of rx or other adjustment in her rx         RECOMMENDATIONS given include: perform routine monitoring of blood pressure with home blood pressure cuff, reduction of dietary salt intake, and smoking cessation.      FOLLOW-UP: recheck bp MIPS Smoking cessation encouraged. Counseling for less than 3 minutes.            Prescriptions:       [Refilled] hydroCHLOROthiazide 12.5 mg oral tablet [Take 1 tablet by mouth once daily], #90 (ninety) tablets, Refills: 0 (zero)         Tinnitus, left ear        REFERRALS:  Referral initiated to an E/N/T ( Dr. Will Florez, [facility]; for evaluation of tinnitus, left ear ).            Orders:       REFER  Referral to Specialist or Other Facility  (Send-Out)                  Patient Recommendations:        For  Essential (primary) hypertension:    Begin monitoring your blood pressure by brief nurse visits at our office, a home blood pressure monitor, or by checking on the machines in pharmacies or stores.  Keep a log of the readings. Reduce the amount of salt in your diet. Stop smoking!              Charge Capture:         Primary Diagnosis:     E03.8  Other specified  hypothyroidism           Orders:      [id number]  Office/outpatient visit; established patient, level 4  (In-House)              E78.2  Mixed hyperlipidemia     I10  Essential (primary) hypertension     H93.12  Tinnitus, left ear

## 2021-05-18 NOTE — PROGRESS NOTES
Judith FitzpatrickJoshua 1960     Office/Outpatient Visit    Visit Date:  08:30 am    Provider: Linette Tracey N.P. (Assistant: Romana Jane MA)    Location: [facility]        Electronically signed by Linette Tracey N.P. on  2018 10:27:28 AM                             SUBJECTIVE:        CC:     Ms. Fitzpatrick is a 58 year old White female.  This is a follow-up visit.  med refills;         HPI:         Ms. Fitzpatrick presents with hypertension.  Her current cardiac medication regimen includes a diuretic ( HCTZ ).  Ms. Fitzpatrick does not check her blood pressure other than at her clinic appointments.  She is tolerating the medication well without side effects.  Compliance with treatment has been good; she takes her medication as directed.          Dx with acquired hypothyroidism, other specified cause; she is currently taking Levothyroid, 100 mcg daily.          With regard to the high cholesterol, current treatment includes Crestor.  Compliance with treatment has been good; she takes her medication as directed.  She denies experiencing any hypercholesterolemia related symptoms.  Most recent lab tests include Glucose, Serum:  103 (mg/dL) (2018), Total Cholesterol:  255 (mg/dL) (2018), HDL:  73 (mg/dL) (2018), Triglycerides:  165 (mg/dL) (2018), LDL Cholesterol:  149 (mg/dL) (2018).      ROS:     CONSTITUTIONAL:  Negative for chills, fatigue, fever, and weight change.      CARDIOVASCULAR:  Negative for chest pain, palpitations, tachycardia, orthopnea, and edema.      RESPIRATORY:  Negative for cough, dyspnea, and hemoptysis.      NEUROLOGICAL:  Negative for dizziness, headaches, paresthesias, and weakness.          PMH/FMH/SH:     Last Reviewed on 2018 08:51 AM by Linette Tracey    Past Medical History:             GYNECOLOGICAL HISTORY:             PREVENTIVE HEALTH MAINTENANCE             Hepatitis C Medicare Screening: was last done 17;  negative         Surgical History:     Surgeries:    Biopsy of breast; benign; 2004      Dilation and Curettage    Bilateral Tubal Ligation    BENIGN TUMOR REMOVED FROM NECK;    uterine ablation;     Procedures:    Colonoscopy (  ) heart cath normal 4--14         Family History:         Positive for Coronary Artery Disease and Hypertension.      Positive for Type 2 Diabetes and Hypothyroidism.  Father:  at age 70's; Cause of death was stomach cancer;  Hypertension;  Type 2 Diabetes     Mother: Hypertension; Hyperlipidemia         Social History:     Occupation: Retired (Prior occupation: [employer] )     Marital Status:      Children: 2 children, 3 step-children, and (2 grandchildren) step children         Tobacco/Alcohol/Supplements:     Last Reviewed on 2018 08:33 AM by Romana Jane    Tobacco: Current Smoker: She currently smokes every day, 1 pack per day.  Non-drinker     Caffeine:  She admits to consuming caffeine via coffee ( 10 servings per day ).              Immunizations:     zzFluzone pf-quadrivalent 3 and up 2015     Fluzone (3 + years dose) 2010     Adacel (Tdap) 2009         Allergies:     Last Reviewed on 2018 08:32 AM by Romana Jane      No Known Drug Allergies.     Lipitor: nausea (Adverse Reaction)        Current Medications:     Last Reviewed on 2018 08:33 AM by Romana Jane    Crestor 10mg Tablet Take 1 tablet(s) by mouth daily  at bedtime     Hydrochlorothiazide (HCTZ) 25mg Tablet one a day     Levoxyl 0.1mg Tablet Take 1 tablet(s) by mouth daily         OBJECTIVE:        Vitals:         Current: 2018 8:32:22 AM    Ht:  5 ft, 5 in;  Wt: 129.6 lbs;  BMI: 21.6    T: 97.3 F (oral);  BP: 139/93 mm Hg (left arm, sitting);  P: 68 bpm (left arm (BP Cuff), sitting);  sCr: 0.88 mg/dL;  GFR: 65.74        Exams:     PHYSICAL EXAM:     GENERAL: vital signs recorded - well developed, well nourished;  no apparent distress;     NECK:  carotid exam reveals no bruits;     RESPIRATORY: normal respiratory rate and pattern with no distress; normal breath sounds with no rales, rhonchi, wheezes or rubs;     CARDIOVASCULAR: normal rate; rhythm is regular;  no systolic murmur; no edema;     PSYCHIATRIC:  appropriate affect and demeanor; normal speech pattern; grossly normal memory;         ASSESSMENT           401.1   I10  Hypertension              DDx:     244.8   E03.8  Acquired hypothyroidism, other specified cause              DDx:     272.0   E78.2  High cholesterol              DDx:         ORDERS:         Meds Prescribed:       Refill of: Crestor (Rosuvastatin) 10mg Tablet Take 1 tablet(s) by mouth daily  at bedtime  #90 (Ninety) tablet(s) Refills: 1       Refill of: Hydrochlorothiazide (HCTZ) 25mg Tablet one a day  #90 (Ninety) tablet(s) Refills: 1         Lab Orders:       [id number]  [id number] [facility]  ([facility]         [id number]  [id number] [facility] Comp Metabolic Panel (14)  (Send-Out)         [id number]  [id number] [facility] Lipid Panel (Excludes LDL/HDL ratio)  (Send-Out)                   PLAN:          Hypertension had coffee with sugar this am but will [facility] today to get her [facility]           Prescriptions:       Refill of: Hydrochlorothiazide (HCTZ) 25mg Tablet one a day  #90 (Ninety) tablet(s) Refills: 1          Acquired hypothyroidism, other specified cause will send rx after labs back     LABORATORY:  [facility]  TSH           Orders:       [id number]  [id number] [facility]  (Send-Out)            High cholesterol she has done well on new rx, crestor ( advised to get hep a vaccine)     LABORATORY:  [facility]  CMP Lipid panel           Prescriptions:       Refill of: Crestor (Rosuvastatin) 10mg Tablet Take 1 tablet(s) by mouth daily  at bedtime  #90 (Ninety) tablet(s) Refills: 1           Orders:       [id number] [facility] Comp Met[facility] Panel (14)  (Send-Out)          [id number]  [id number] [facility] Lipid Panel (Excludes LDL/HDL ratio)  (Send-Out)             Patient Education Handouts:       Hepatitis A              CHARGE CAPTURE           **Please note: ICD descriptions below are intended for billing purposes only and may not represent clinical diagnoses**        Primary Diagnosis:         401.1 Hypertension            I10    Essential (primary) hypertension              Orders:          [id number]   Office/outpatient visit; established patient, level 4  (In-House)           244.8 Acquired hypothyroidism, other specified cause            E03.8    Other specified hypothyroidism    272.0 High cholesterol            E78.2    Mixed hyperlipidemia

## 2021-05-18 NOTE — PROGRESS NOTES
Judith Fitzpatrick  1960     Office/Outpatient Visit    Visit Date: Thu, Jan 9, 2020 11:11 am    Provider: Linette Tracey N.P. (Assistant: Romana Jane MA)    Location: [facility]        Electronically signed by Linette Tracey N.P. on  01/09/2020 01:49:26 PM                             Subjective:        CC: Ms. Fitzpatrick is a 59 year old White female.  This is a follow-up visit.  med refills;         HPI:           PHQ-9 Depression Screening: Completed form scanned and in chart; Total Score 0           Additionally, she presents with history of other specified hypothyroidism.  she is currently taking Levothyroid, 100 mcg daily.  TSH was last checked <1 months ago.  The result was reported as normal.  takes all her rx together           Dx with mixed hyperlipidemia; current treatment includes Crestor.  Most recent lab tests include Systolic BP:  147 (01/09/2020), Diastolic BP:  100 (01/09/2020), TSH:  0.482 (uIU/mL) (12/20/2019), Glucose, Serum:  103 (mg/dL) (06/06/2019), ALT (SGPT):  13 (IU/L) (06/06/2019), Total Cholesterol:  189 (mg/dL) (06/06/2019), HDL:  59 (mg/dL) (06/06/2019), Triglycerides:  147 (mg/dL) (06/06/2019), LDL Cholesterol:  101 (mg/dL) (06/06/2019).            In regard to the essential (primary) hypertension, her current cardiac medication regimen includes a diuretic ( Hydrochlorothiazide (HCTZ) ).  Ms. Fitzpatrick does not check her blood pressure other than at her clinic appointments.  She is tolerating the medication well without side effects.  Compliance with treatment has been good; she takes her medication as directed.      ROS:     CONSTITUTIONAL:  Negative for chills, fatigue, fever, and weight change.      CARDIOVASCULAR:  Negative for chest pain, palpitations, tachycardia, orthopnea, and edema.      RESPIRATORY:  Negative for cough, dyspnea, and hemoptysis.  still smoking     NEUROLOGICAL:  Negative for dizziness, headaches, paresthesias, and weakness.           Past Medical History / Family History / Social History:         Last Reviewed on 2020 11:23 AM by Linette Tracey    Past Medical History:             GYNECOLOGICAL HISTORY:             PREVENTIVE HEALTH MAINTENANCE             Hepatitis C Medicare Screening: was last done 17; negative         Surgical History:     Surgeries:    Biopsy of breast; benign; 2004     Dilation and Curettage    Bilateral Tubal Ligation    BENIGN TUMOR REMOVED FROM NECK;    uterine ablation;     Procedures:    Colonoscopy (  ) heart cath normal 14         Family History:         Positive for Coronary Artery Disease and Hypertension.      Positive for Type 2 Diabetes and Hypothyroidism.  Father:  at age 70's; Cause of death was stomach cancer;  Hypertension;  Type 2 Diabetes     Mother: Hypertension; Hyperlipidemia         Social History:     Occupation: [employer] 2-3 days per  week. Retired (Prior occupation: [employer] )     Marital Status:      Children: 2 children, 3 step-children, and (2 grandchildren) step children (living with her)         Tobacco/Alcohol/Supplements:     Last Reviewed on 2020 11:14 AM by Romana Jane    Tobacco: Current Smoker: She currently smokes every day, 1 pack per day.  Non-drinker     Caffeine:  She admits to consuming caffeine via coffee ( 10 servings per day ).          Immunizations:     zzFluzone pf-quadrivalent 3 and up 2015    Fluzone (3 + years dose) 2010    Adacel (Tdap) 2009        Allergies:     Last Reviewed on 2020 11:14 AM by Romana Jane    Lipitor: Nausea  (Adverse Reaction)        Current Medications:     Last Reviewed on 2020 11:14 AM by Romana Jane    Levoxyl 100 mcg oral tablet [Take 1 tablet(s) by mouth daily]    hydroCHLOROthiazide 12.5 mg oral tablet [1 po daily]    Crestor 10 mg oral tablet [Take 1 tablet(s) by mouth daily  at bedtime]        Objective:        Vitals:         Current:  1/9/2020 11:16:37 AM    Ht:  5 ft, 5 in;  Wt: 130.8 lbs;  BMI: 21.8T: 98.1 F (oral);  BP: 147/100 mm Hg (left arm, sitting);  P: 87 bpm (left arm (BP Cuff), sitting);  sCr: 0.91 mg/dL;  GFR: 63.07        Repeat:     11:38:42 AM  BP:   125/92mm Hg (left arm, sitting) 11:38:58 AM  P:   68bpm (left arm (BP Cuff), sitting)     Exams:     PHYSICAL EXAM:     GENERAL: vital signs recorded - well developed, well nourished;  no apparent distress;     NECK: carotid exam reveals no bruits;     RESPIRATORY: normal respiratory rate and pattern with no distress; normal breath sounds with no rales, rhonchi, wheezes or rubs;     CARDIOVASCULAR: normal rate; rhythm is regular;  no systolic murmur; no edema;     PSYCHIATRIC:  appropriate affect and demeanor; normal speech pattern; grossly normal memory;         Assessment:         Z13.31   Encounter for screening for depression       E03.8   Other specified hypothyroidism       E78.2   Mixed hyperlipidemia       I10   Essential (primary) hypertension           ORDERS:         Meds Prescribed:       [Refilled] Levoxyl 100 mcg oral tablet [Take 1 tablet(s) by mouth daily], #90 (ninety) tablets, Refills: 0 (zero)       [Refilled] Crestor 10 mg oral tablet [Take 1 tablet(s) by mouth daily  at bedtime], #90 (ninety) tablets, Refills: 1 (one)       [Refilled] hydroCHLOROthiazide 12.5 mg oral tablet [1 po daily], #30 (thirty) tablets, Refills: 0 (zero)         Lab Orders:       FUTURE  Future order to be done at patients convenience  (Send-Out)            [id number]  [id number] [facility] Comp Metabolic Panel (14)  (Send-Out)            [id number]  [id number] [facility] Lipid Panel with reflex  (Send-Out)              Other Orders:         Depression screen negative  (In-House)                      Plan:         Encounter for screening for depressiondeclines flu vaccine     MIPS PHQ-9 Depression Screening: Completed form scanned and in chart; Total Score 0; Negative Depression Screen  Smoking cessation  encouraged. Counseling for less than 3 minutes.            Orders:         Depression screen negative  (In-House)              Other specified hypothyroidismexpress scripts is her pharmacy, to take her thyroid rx on an empty stomach, recheck lab in 3 months        FOLLOW-UP: lab in 3 months           Prescriptions:       [Refilled] Levoxyl 100 mcg oral tablet [Take 1 tablet(s) by mouth daily], #90 (ninety) tablets, Refills: 0 (zero)         Mixed hyperlipidemia        FOLLOW-UP TESTING #1: FOLLOW-UP LABORATORY:  Labs to be scheduled in the future include.  CMP Lipid panel           Prescriptions:       [Refilled] Crestor 10 mg oral tablet [Take 1 tablet(s) by mouth daily  at bedtime], #90 (ninety) tablets, Refills: 1 (one)           Orders:       FUTURE  Future order to be done at patients convenience  (Send-Out)            [id number]  [id number] [facility] Comp Metabolic Panel (14)  (Send-Out)            [id number]  [id number] [facility] Lipid Panel with reflex  (Send-Out)              Essential (primary) hypertensionrecheck bp        RECOMMENDATIONS given include: perform routine monitoring of blood pressure with home blood pressure cuff, reduction of dietary salt intake, and Advised about free BP checks on the last Saturday of each month.      FOLLOW-UP: recheck bp           Prescriptions:       [Refilled] hydroCHLOROthiazide 12.5 mg oral tablet [1 po daily], #30 (thirty) tablets, Refills: 0 (zero)             Patient Recommendations:        For  Essential (primary) hypertension:    Begin monitoring your blood pressure by brief nurse visits at our office, a home blood pressure monitor, or by checking on the machines in pharmacies or stores.  Keep a log of the readings. Reduce the amount of salt in your diet.              Charge Capture:         Primary Diagnosis:     Z13.31  Encounter for screening for depression           Orders:      [id number]  Office/outpatient visit; established patient, level 4  (In-House)               Depression screen negative  (In-House)              E03.8  Other specified hypothyroidism     E78.2  Mixed hyperlipidemia     I10  Essential (primary) hypertension

## 2021-05-18 NOTE — PROGRESS NOTES
Judith Fitzpatrick  1960     Office/Outpatient Visit    Visit Date: Mon, Jan 4, 2021 08:52 am    Provider: Linette Tracey N.P. (Assistant: Angelita Higuera RN)    Location: [facility]        Electronically signed by Linette Tracey N.P. on  01/04/2021 11:33:20 AM                             Subjective:        CC: Ms. Fitzpatrick is a 60 year old White female.  This is a follow-up visit.          HPI:           Patient to be evaluated for other specified hypothyroidism.  She is currently taking Levothyroid, 100 mcg daily.  did not take her rx today yet           Mixed hyperlipidemia details; current treatment includes Crestor.  Compliance with treatment has been good; she takes her medication as directed.  She denies experiencing any hypercholesterolemia related symptoms.  Most recent lab tests include Glucose, Serum:  102 (mg/dL) (07/09/2020), ALT (SGPT):  19 (IU/L) (07/09/2020), TSH:  0.458 (uIU/mL) (07/09/2020), Total Cholesterol:  208 (mg/dL) (07/09/2020), HDL:  76 (mg/dL) (07/09/2020), LDL Cholesterol:  109 (mg/dL) (07/09/2020).            Dx with essential (primary) hypertension; her current cardiac medication regimen includes a diuretic ( Hydrochlorothiazide (HCTZ) ).  Ms. Fitzpatrick does not check her blood pressure other than at her clinic appointments.  She is tolerating the medication well without side effects.  Compliance with treatment has been good; she takes her medication as directed.      ROS:     CONSTITUTIONAL:  Negative for fever.      CARDIOVASCULAR:  Negative for chest pain, palpitations, tachycardia, orthopnea, and edema.      RESPIRATORY:  Negative for recent cough and dyspnea.      GENITOURINARY:  Positive for occ has low pelvic pain, says last pap here was years ago.   Negative for post-menopausal vaginal bleeding.      NEUROLOGICAL:  Negative for dizziness, headaches, paresthesias, and weakness.          Past Medical History / Family History / Social History:          Last Reviewed on 2021 09:32 AM by Linette Tracey    Past Medical History:             GYNECOLOGICAL HISTORY:             PREVENTIVE HEALTH MAINTENANCE             Hepatitis C Medicare Screening: was last done 17; negative         Surgical History:     Surgeries:    Biopsy of breast; benign; 2004     Dilation and Curettage    Bilateral Tubal Ligation    BENIGN TUMOR REMOVED FROM NECK;    uterine ablation;     Procedures:    Colonoscopy (  ) heart cath normal 14         Family History:         Positive for Coronary Artery Disease and Hypertension.      Positive for Type 2 Diabetes and Hypothyroidism.  Father:  at age 70's; Cause of death was stomach cancer;  Hypertension;  Type 2 Diabetes     Mother: Hypertension; Hyperlipidemia         Social History:     Occupation: Retired (Prior occupation: [employer] )     Marital Status:      Children: 2 children, 3 step-children, and (2 grandchildren) step children (living with her)         Tobacco/Alcohol/Supplements:     Last Reviewed on 2021 08:53 AM by Angelita Higuera    Tobacco: Current Smoker: She currently smokes every day, 1/2 to 1 pack per day.  Non-drinker     Caffeine:  She admits to consuming caffeine via coffee ( 10 servings per day ).          Immunizations:     zzFluzone pf-quadrivalent 3 and up 2015    Fluzone (3 + years dose) 2010    Adacel (Tdap) 2009        Allergies:     Last Reviewed on 2021 08:52 AM by Angelita Higuera    Lipitor: Nausea  (Adverse Reaction)        Current Medications:     Last Reviewed on 2021 08:52 AM by Angelita Higuera    Levoxyl 100 mcg oral tablet [Take 1 tablet(s) by mouth daily]    hydroCHLOROthiazide 12.5 mg oral tablet [Take 1 tablet by mouth once daily]    Crestor 10 mg oral tablet [Take 1 tablet(s) by mouth daily  at bedtime]        Objective:        Vitals:         Current: 2021 8:56:18 AM    Ht:  5 ft, 5 in;  Wt: 138.4 lbs;  BMI:  23.0T: 96.8 F (temporal);  BP: 150/93 mm Hg (right arm, sitting);  P: 82 bpm (right arm (BP Cuff), sitting);  sCr: 0.86 mg/dL;  GFR: 67.54        Repeat:     9:43:31 AM  BP:   156/94mm Hg (right arm, sitting)     Exams:     PHYSICAL EXAM:     GENERAL: vital signs recorded - well developed, well nourished;  no apparent distress;     NECK: carotid exam reveals no bruits;     RESPIRATORY: normal respiratory rate and pattern with no distress; normal breath sounds with no rales, rhonchi, wheezes or rubs;     CARDIOVASCULAR: normal rate; rhythm is regular;  no systolic murmur; no edema;     PSYCHIATRIC:  appropriate affect and demeanor; normal speech pattern; grossly normal memory;         Procedures:     Encounter for immunization    Regarding contraindications to an Influenza vaccine: Denies moderate/severe illness with/without fever; serious reaction to eggs, egg proteins, gentamicin, gelatin, arginine, neomycin or polymixin; serious reaction after recieving previous influenza vaccines; and history of Guillain-Irvine Syndrome.        No contraindications were noted.  Pt tolerated injection well             Assessment:         E03.8   Other specified hypothyroidism       E78.2   Mixed hyperlipidemia       I10   Essential (primary) hypertension       Z23   Encounter for immunization           ORDERS:         Meds Prescribed:       [Refilled] Levoxyl 100 mcg oral tablet [Take 1 tablet(s) by mouth daily], #90 (ninety) tablets, Refills: 0 (zero)       [Refilled] Crestor 10 mg oral tablet [Take 1 tablet(s) by mouth daily  at bedtime], #90 (ninety) tablets, Refills: 0 (zero)       [Refilled] hydroCHLOROthiazide 12.5 mg oral tablet [Take 1 tablet by mouth once daily], #90 (ninety) tablets, Refills: 0 (zero)         Lab Orders:       04693  TSH - [facility] TSH  (Send-Out)            87363  HTNLP - [facility] CMP AND LIPID: [id number], [id number]  (Send-Out)              Procedures Ordered:       41696  Immunization administration; one vaccine   (In-House)              Other Orders:       05893  Influenza virus vaccine, quadrivalent, split virus, preservative free 3 years of age & older  (In-House)                      Plan:         [facility]update with flu vaccine today/ last pap here was normal 2009, schedule a WWE    LABORATORY:  Labs ordered to be performed today include TSH.      FOLLOW-UP:.  :for Well Woman Exam           Prescriptions:       [Refilled] Levoxyl 100 mcg oral tablet [Take 1 tablet(s) by mouth daily], #90 (ninety) tablets, Refills: 0 (zero)           Orders:       30424  TSH - [facility] TSH  (Send-Out)              Mixed hyperlipidemia    LABORATORY:  Labs ordered to be performed today include HTN/Lipid Panel: CMP, Lipid.            Prescriptions:       [Refilled] Crestor 10 mg oral tablet [Take 1 tablet(s) by mouth daily  at bedtime], #90 (ninety) tablets, Refills: 0 (zero)           Orders:       13907  HTNLP - [facility] CMP AND LIPID: [id number], [id number]  (Send-Out)              Essential (primary) hypertensionrecheck bp     MIPS Smoking cessation encouraged. Counseling for less than 3 minutes.      RECOMMENDATIONS given include: perform routine monitoring of blood pressure with home blood pressure cuff.            Prescriptions:       [Refilled] hydroCHLOROthiazide 12.5 mg oral tablet [Take 1 tablet by mouth once daily], #90 (ninety) tablets, Refills: 0 (zero)         Encounter for immunization          Immunizations:       45433  Immunization administration; one vaccine  (In-House)            21130  Influenza virus vaccine, quadrivalent, split virus, preservative free 3 years of age & older  (In-House)                Dose (ml): 0.5  Site: right deltoid  Route: intramuscular  Administered by: Angelita Higuera          : [employer]  Lot #: [id number]  Exp: 06/30/2021          NDC: [address]-99            Patient Recommendations:        For  Other specified hypothyroidism:                     APPOINTMENT INFORMATION:        Monday Tuesday Wednesday Thursday Friday Saturday Sunday            Time:___________________AM  PM   Date:_____________________         For  Essential (primary) hypertension:    Begin monitoring your blood pressure by brief nurse visits at our office, a home blood pressure monitor, or by checking on the machines in pharmacies or stores.  Keep a log of the readings.              Charge Capture:         Primary Diagnosis:     E03.8  Other specified hypothyroidism           Orders:      60974  Office/outpatient visit; established patient, level 4  (In-House)              E78.2  Mixed hyperlipidemia     I10  Essential (primary) hypertension     Z23  Encounter for immunization           Orders:      34914  Immunization administration; one vaccine  (In-House)            54339  Influenza virus vaccine, quadrivalent, split virus, preservative free 3 years of age & older  (In-House)                  ADDENDUMS:      ____________________________________    Addendum: 01/05/2021 01:04 PM - Linette Tracey        Add [id number]; Remove 02027

## 2021-05-18 NOTE — PROGRESS NOTES
Judith Fitzpatrick 1960     Office/Outpatient Visit    Visit Date: Thu, Jun 6, 2019 09:24 am    Provider: Linette Tracey N.P. (Assistant: Kiara Davalos MA)    Location: [facility]        Electronically signed by Linette Tracey N.P. on  06/06/2019 10:00:51 AM                             SUBJECTIVE:        CC:     Ms. Fitzpatrick is a 59 year old White female.  This is a follow-up visit.  check up;         HPI:         Patient to be evaluated for hypertension.  Her current cardiac medication regimen includes a diuretic ( HCTZ ).  Ms. Fitzpatrick does not check her blood pressure other than at her clinic appointments.  She is tolerating the medication well without side effects.  Compliance with treatment has been fair; she ran out 1-2 weeks.          With regard to the acquired hypothyroidism, other specified cause, she is currently taking Levoxyl, 100 mcg daily.  TSH was last checked more than 6 months ago.  The result was reported as normal.          With regard to the high cholesterol, current treatment includes Crestor.  Compliance with treatment has been good.  She denies experiencing any hypercholesterolemia related symptoms.  Most recent lab tests include TSH:  0.682 (uIU/mL) (07/16/2018), Glucose, Serum:  97 (mg/dL) (07/16/2018), ALT (SGPT):  15 (IU/L) (07/16/2018), Total Cholesterol:  179 (mg/dL) (07/16/2018), HDL:  64 (mg/dL) (07/16/2018), Triglycerides:  101 (mg/dL) (07/16/2018), VLDL Cholesterol:  20 (mg/dL) (07/16/2018).      ROS:     CONSTITUTIONAL:  Negative for chills, fatigue, fever, and weight change.      CARDIOVASCULAR:  Negative for chest pain, palpitations, tachycardia, orthopnea, and edema.      RESPIRATORY:  Negative for cough, dyspnea, and hemoptysis.      MUSCULOSKELETAL:  Positive for fell at work, hit her head but not injured.  earlier this year     NEUROLOGICAL:  Negative for dizziness, headaches, paresthesias, and weakness.          PMH/FMH/SH:     Last Reviewed on 6/06/2019  09:46 AM by Linette Tracey    Past Medical History:             GYNECOLOGICAL HISTORY:             PREVENTIVE HEALTH MAINTENANCE             Hepatitis C Medicare Screening: was last done 17; negative         Surgical History:     Surgeries:    Biopsy of breast; benign; 2004      Dilation and Curettage    Bilateral Tubal Ligation    BENIGN TUMOR REMOVED FROM NECK;    uterine ablation;     Procedures:    Colonoscopy (  ) heart cath normal 4-2-14         Family History:         Positive for Coronary Artery Disease and Hypertension.      Positive for Type 2 Diabetes and Hypothyroidism.  Father:  at age 70's; Cause of death was stomach cancer;  Hypertension;  Type 2 Diabetes     Mother: Hypertension; Hyperlipidemia         Social History:     Occupation: [employer] 2-3 days per  week. Retired (Prior occupation: [employer] )     Marital Status:      Children: 2 children, 3 step-children, and (2 grandchildren) step children (living with her)         Tobacco/Alcohol/Supplements:     Last Reviewed on 2019 09:27 AM by Kiara Davalos    Tobacco: Current Smoker: She currently smokes every day, 1 pack per day.  Non-drinker     Caffeine:  She admits to consuming caffeine via coffee ( 10 servings per day ).              Immunizations:     zzFluzone pf-quadrivalent 3 and up 2015     Fluzone (3 + years dose) 2010     Adacel (Tdap) 2009         Allergies:     Last Reviewed on 2019 09:27 AM by Kiara Davalos      No Known Drug Allergies.     Lipitor: nausea (Adverse Reaction)        Current Medications:     Last Reviewed on 2019 09:27 AM by Kiara Davalos    Crestor 10mg Tablet Take 1 tablet(s) by mouth daily  at bedtime     Hydrochlorothiazide (HCTZ) 25mg Tablet one a day     Levoxyl 0.1mg Tablet Take 1 tablet(s) by mouth daily         OBJECTIVE:        Vitals:         Current: 2019 9:28:52 AM    Ht:  5 ft, 5 in;  Wt: 128.2 lbs;  BMI: 21.3    T: 98 F (oral);   BP: 147/88 mm Hg (left arm, sitting);  P: 63 bpm (left arm (BP Cuff), sitting);  sCr: 0.86 mg/dL;  GFR: 66.17        Exams:     PHYSICAL EXAM:     GENERAL: vital signs recorded - well developed, well nourished;  no apparent distress;     NECK: carotid exam reveals no bruits;     RESPIRATORY: normal respiratory rate and pattern with no distress; normal breath sounds with no rales, rhonchi, wheezes or rubs;     CARDIOVASCULAR: normal rate; rhythm is regular;  no systolic murmur; no edema;     PSYCHIATRIC:  appropriate affect and demeanor; normal speech pattern; grossly normal memory;         ASSESSMENT           401.1   I10  Hypertension              DDx:     244.8   E03.8  Acquired hypothyroidism, other specified cause              DDx:     272.0   E78.2  High cholesterol              DDx:         ORDERS:         Meds Prescribed:       Refill of: Hydrochlorothiazide (HCTZ) 12.5mg Tablet 1 po daily  #90 (Ninety) tablet(s) Refills: 0       Refill of: Crestor (Rosuvastatin) 10mg Tablet Take 1 tablet(s) by mouth daily  at bedtime  #90 (Ninety) tablet(s) Refills: 1       Refill of: Levoxyl (Levothyroxine Sodium) 0.1mg Tablet Take 1 tablet(s) by mouth daily  #90 (Ninety) tablet(s) Refills: 1         Lab Orders:       [id number]  [id number] [facility] TSH  (Send-Out)         76657  [id number] [facility] Comp Metabolic Panel (14)  (Send-Out)         30546  [id number] [facility] Lipid Panel (Excludes LDL/HDL ratio)  (Send-Out)           Other Orders:       1101F  Pt screen for fall risk; document no falls in past year or only 1 fall w/o injury in past year (SHARON)  (In-House)                   PLAN:          Hypertension will  refill her rx but lower dose to HCTZ 12.5 mg daily, [facility] to [facility] /had coffee with sugar this am and she likes to go [facility]     LABORATORY:  Labs [facility] be [facility] at [facility] include.  CMP Lipid panel MIPS Has had no falls in the past year (had one fall, not injured)     RECOMMENDATIONS given include: perform  routine monitoring of blood pressure with home blood pressure cuff.      FOLLOW-UP: recheck bp           Prescriptions:       Refill of: Hydrochlorothiazide (HCTZ) 12.5mg Tablet 1 po daily  #90 (Ninety) tablet(s) Refills: 0           Orders:       08252  [id number] [facility] Comp Metabolic Panel (14)  (Send-Out)         90169  [id number] [facility] Lipid Panel (Excludes LDL/HDL ratio)  (Send-Out)         1101F  Pt screen for fall risk; document no falls in past year or only 1 fall w/o injury in past year (SHARON)  (In-House)            Acquired hypothyroidism, other specified cause     LABORATORY:  Labs ordered to be performed today at [facility] include.  TSH           Prescriptions:       Refill of: Levoxyl (Levothyroxine Sodium) 0.1mg Tablet Take 1 tablet(s) by mouth daily  #90 (Ninety) tablet(s) Refills: 1           Orders:       42749  [id number] [facility] TSH  (Send-Out)            High cholesterol         FOLLOW-UP: Schedule a follow-up visit in 6 months.            Prescriptions:       Refill of: Crestor (Rosuvastatin) 10mg Tablet Take 1 tablet(s) by mouth daily  at bedtime  #90 (Ninety) tablet(s) Refills: 1             Patient Recommendations:        For  Hypertension:     Begin monitoring your blood pressure by brief nurse visits at our office, a home blood pressure monitor, or by checking on the machines in pharmacies or stores.  Keep a log of the readings.          For  High cholesterol:     Schedule a follow-up visit in 6 months.              CHARGE CAPTURE           **Please note: ICD descriptions below are intended for billing purposes only and may not represent clinical diagnoses**        Primary Diagnosis:         401.1 Hypertension            I10    Essential (primary) hypertension              Orders:          59884   Office/outpatient visit; established patient, level 4  (In-House)             1101F   Pt screen for fall risk; document no falls in past year or only 1 fall w/o injury in past year (SHARON)  (In-House)            244.8 Acquired hypothyroidism, other specified cause            E03.8    Other specified hypothyroidism    272.0 High cholesterol            E78.2    Mixed hyperlipidemia

## 2021-06-17 DIAGNOSIS — H91.93 BILATERAL HEARING LOSS, UNSPECIFIED HEARING LOSS TYPE: Primary | ICD-10-CM

## 2021-06-17 DIAGNOSIS — H93.19 TINNITUS, UNSPECIFIED LATERALITY: ICD-10-CM

## 2021-06-21 DIAGNOSIS — H91.90 HEARING LOSS, UNSPECIFIED HEARING LOSS TYPE, UNSPECIFIED LATERALITY: Primary | ICD-10-CM

## 2021-06-23 ENCOUNTER — OFFICE VISIT (OUTPATIENT)
Dept: FAMILY MEDICINE CLINIC | Age: 61
End: 2021-06-23

## 2021-06-23 VITALS
DIASTOLIC BLOOD PRESSURE: 86 MMHG | TEMPERATURE: 98.3 F | SYSTOLIC BLOOD PRESSURE: 123 MMHG | WEIGHT: 132 LBS | BODY MASS INDEX: 21.97 KG/M2 | HEART RATE: 101 BPM

## 2021-06-23 DIAGNOSIS — E78.2 MIXED HYPERLIPIDEMIA: ICD-10-CM

## 2021-06-23 DIAGNOSIS — R05.9 COUGH: ICD-10-CM

## 2021-06-23 DIAGNOSIS — E03.9 ACQUIRED HYPOTHYROIDISM: Primary | ICD-10-CM

## 2021-06-23 DIAGNOSIS — I10 ESSENTIAL HYPERTENSION: ICD-10-CM

## 2021-06-23 PROCEDURE — 99214 OFFICE O/P EST MOD 30 MIN: CPT | Performed by: NURSE PRACTITIONER

## 2021-06-23 RX ORDER — ROSUVASTATIN CALCIUM 10 MG/1
10 TABLET, COATED ORAL NIGHTLY
Qty: 90 TABLET | Refills: 0 | Status: SHIPPED | OUTPATIENT
Start: 2021-06-23 | End: 2021-11-15 | Stop reason: SDUPTHER

## 2021-06-23 RX ORDER — ROSUVASTATIN CALCIUM 10 MG/1
10 TABLET, COATED ORAL NIGHTLY
COMMUNITY
End: 2021-06-23 | Stop reason: SDUPTHER

## 2021-06-23 RX ORDER — HYDROCHLOROTHIAZIDE 12.5 MG/1
12.5 TABLET ORAL DAILY
Qty: 90 TABLET | Refills: 0 | Status: SHIPPED | OUTPATIENT
Start: 2021-06-23 | End: 2021-11-15 | Stop reason: SDUPTHER

## 2021-06-23 RX ORDER — BENZONATATE 200 MG/1
200 CAPSULE ORAL 3 TIMES DAILY PRN
Qty: 30 CAPSULE | Refills: 0 | Status: SHIPPED | OUTPATIENT
Start: 2021-06-23 | End: 2021-07-03

## 2021-06-23 RX ORDER — HYDROCHLOROTHIAZIDE 12.5 MG/1
12.5 TABLET ORAL DAILY
COMMUNITY
End: 2021-06-23 | Stop reason: SDUPTHER

## 2021-06-23 RX ORDER — LEVOTHYROXINE SODIUM 0.1 MG/1
100 TABLET ORAL EVERY MORNING
COMMUNITY
End: 2021-07-12 | Stop reason: SDUPTHER

## 2021-06-23 NOTE — ASSESSMENT & PLAN NOTE
She will go to [facility] for her labs in the next few weeks, reviewed last labs 1-2021, TSH was normal, will check lab before sending this refill

## 2021-06-23 NOTE — PROGRESS NOTES
"Judith Fitzpatrick presents to [facility].    Chief Complaint:  Hyperlipidemia (pt also states she has had a cough and sinus pressure x 3 weeks)         History of Present Illness:  rx refills/ Hypelipidemia/ thyroid and bp;  follow up/ dry cough &  sinus issues 3 weeks (all clear or white drainage) Her cough is worse at night and she has tried sudafed, elbert selzer OTC.  Still smoking She initially had a sore throat and headache that came and went.  Had some dizziness a week or so ago.  She was given a cough \"pill\" in the past that helped.       Review of Systems:  Review of Systems   Constitutional: Positive for fatigue. Negative for fever.   Respiratory: Negative for shortness of breath and wheezing.    Cardiovascular: Negative for chest pain and leg swelling.          Vital Signs:   /86 (BP Location: Left arm, Patient Position: Sitting, Cuff Size: Adult)   Pulse 101   Temp 98.3 °F (36.8 °C) (Oral)   Wt 59.9 kg (132 lb)   BMI 21.97 kg/m²       Physical Exam:  Physical Exam  Constitutional:       General: She is not in acute distress.     Appearance: Normal appearance.   HENT:      Right Ear: Tympanic membrane, ear canal and external ear normal.      Left Ear: Tympanic membrane, ear canal and external ear normal.      Nose:      Comments: No maxillary sinus tenderness.      Mouth/Throat:      Pharynx: Oropharynx is clear. No posterior oropharyngeal erythema.   Neck:      Vascular: No carotid bruit.   Cardiovascular:      Rate and Rhythm: Normal rate and regular rhythm.      Heart sounds: No murmur heard.     Pulmonary:      Effort: Pulmonary effort is normal.      Breath sounds: Normal breath sounds.   Lymphadenopathy:      Cervical: No cervical adenopathy.   Neurological:      Mental Status: She is alert.   Psychiatric:         Mood and Affect: Mood normal.         Behavior: Behavior normal.         Result Review      The following data was reviewed by: Linette Tracey, " APRN on 06/23/2021:    Results for orders placed or performed in visit on 06/22/21    MAMMOGRAPHY   Result Value Ref Range    HM Mammogram see report    HM COLONOSCOPY   Result Value Ref Range    HM Colonoscopy see report                Assessment and Plan:          Diagnoses and all orders for this visit:    1. Acquired hypothyroidism (Primary)  Assessment & Plan:  She will go to [facility] for her labs in the next few weeks, reviewed last labs 1-2021, TSH was normal, will check lab before sending this refill     Orders:  -     TSH; Future    2. Essential hypertension  -     Comprehensive Metabolic Panel; Future  -     hydroCHLOROthiazide (HYDRODIURIL) 12.5 MG tablet; Take 1 tablet by mouth Daily for 90 days.  Dispense: 90 tablet; Refill: 0    3. Mixed hyperlipidemia  -     Lipid Panel; Future  -     rosuvastatin (Crestor) 10 MG tablet; Take 1 tablet by mouth Every Night for 90 days.  Dispense: 90 tablet; Refill: 0    4. Cough  Assessment & Plan:  Advised to quit smoking, recommend she try Zyrtec and tessalon perles at night.      Orders:  -     benzonatate (TESSALON) 200 MG capsule; Take 1 capsule by mouth 3 (Three) Times a Day As Needed for Cough for up to 10 days.  Dispense: 30 capsule; Refill: 0        Follow Up   Return if symptoms worsen or fail to improve, for followup pending lab results.  Patient was given instructions and counseling regarding her condition or for health maintenance advice. Please see specific information pulled into the AVS if appropriate.

## 2021-07-01 VITALS
HEIGHT: 65 IN | HEART RATE: 68 BPM | WEIGHT: 129.6 LBS | DIASTOLIC BLOOD PRESSURE: 93 MMHG | SYSTOLIC BLOOD PRESSURE: 139 MMHG | TEMPERATURE: 97.3 F | BODY MASS INDEX: 21.59 KG/M2

## 2021-07-01 VITALS
BODY MASS INDEX: 22.11 KG/M2 | HEART RATE: 95 BPM | TEMPERATURE: 97.6 F | HEIGHT: 65 IN | SYSTOLIC BLOOD PRESSURE: 154 MMHG | WEIGHT: 132.7 LBS | DIASTOLIC BLOOD PRESSURE: 98 MMHG

## 2021-07-01 VITALS
DIASTOLIC BLOOD PRESSURE: 88 MMHG | HEART RATE: 63 BPM | WEIGHT: 128.2 LBS | HEIGHT: 65 IN | BODY MASS INDEX: 21.36 KG/M2 | TEMPERATURE: 98 F | SYSTOLIC BLOOD PRESSURE: 147 MMHG

## 2021-07-02 VITALS
TEMPERATURE: 98.1 F | DIASTOLIC BLOOD PRESSURE: 92 MMHG | HEIGHT: 65 IN | HEART RATE: 68 BPM | BODY MASS INDEX: 21.79 KG/M2 | WEIGHT: 130.8 LBS | SYSTOLIC BLOOD PRESSURE: 125 MMHG

## 2021-07-02 VITALS
BODY MASS INDEX: 22.26 KG/M2 | TEMPERATURE: 97.3 F | DIASTOLIC BLOOD PRESSURE: 81 MMHG | HEART RATE: 65 BPM | HEIGHT: 65 IN | SYSTOLIC BLOOD PRESSURE: 163 MMHG | WEIGHT: 133.6 LBS

## 2021-07-02 VITALS
HEIGHT: 65 IN | WEIGHT: 138.4 LBS | SYSTOLIC BLOOD PRESSURE: 156 MMHG | TEMPERATURE: 96.8 F | BODY MASS INDEX: 23.06 KG/M2 | DIASTOLIC BLOOD PRESSURE: 94 MMHG | HEART RATE: 82 BPM

## 2021-07-02 VITALS
WEIGHT: 138 LBS | DIASTOLIC BLOOD PRESSURE: 85 MMHG | BODY MASS INDEX: 22.99 KG/M2 | HEART RATE: 73 BPM | SYSTOLIC BLOOD PRESSURE: 148 MMHG | TEMPERATURE: 97.7 F | HEIGHT: 65 IN

## 2021-07-08 LAB
ALBUMIN SERPL-MCNC: 4.7 G/DL (ref 3.8–4.8)
ALBUMIN/GLOB SERPL: 1.9 {RATIO} (ref 1.2–2.2)
ALP SERPL-CCNC: 89 IU/L (ref 48–121)
ALT SERPL-CCNC: 14 IU/L (ref 0–32)
AMBIG ABBREV CMP14 DEFAULT: NORMAL
AMBIG ABBREV LP DEFAULT: NORMAL
AST SERPL-CCNC: 16 IU/L (ref 0–40)
BILIRUB SERPL-MCNC: 0.3 MG/DL (ref 0–1.2)
BUN SERPL-MCNC: 12 MG/DL (ref 8–27)
BUN/CREAT SERPL: 14 (ref 12–28)
CALCIUM SERPL-MCNC: 9.7 MG/DL (ref 8.7–10.3)
CHLORIDE SERPL-SCNC: 102 MMOL/L (ref 96–106)
CHOLEST SERPL-MCNC: 207 MG/DL (ref 100–199)
CO2 SERPL-SCNC: 26 MMOL/L (ref 20–29)
CREAT SERPL-MCNC: 0.86 MG/DL (ref 0.57–1)
GLOBULIN SER CALC-MCNC: 2.5 G/DL (ref 1.5–4.5)
GLUCOSE SERPL-MCNC: 109 MG/DL (ref 65–99)
HDLC SERPL-MCNC: 64 MG/DL
LDLC SERPL CALC-MCNC: 119 MG/DL (ref 0–99)
POTASSIUM SERPL-SCNC: 4.6 MMOL/L (ref 3.5–5.2)
PROT SERPL-MCNC: 7.2 G/DL (ref 6–8.5)
SODIUM SERPL-SCNC: 140 MMOL/L (ref 134–144)
TRIGL SERPL-MCNC: 140 MG/DL (ref 0–149)
TSH SERPL DL<=0.005 MIU/L-ACNC: 0.44 UIU/ML (ref 0.45–4.5)
VLDLC SERPL CALC-MCNC: 24 MG/DL (ref 5–40)

## 2021-07-12 DIAGNOSIS — E03.9 HYPOTHYROIDISM, UNSPECIFIED TYPE: Primary | ICD-10-CM

## 2021-07-13 NOTE — TELEPHONE ENCOUNTER
LAST OV: 1/12/21  NEXT OV: 12/16/21  LAST REFILL: 5/12/21  LAST LAB: 7/7/21    PLEASE SIGN OR ADVISE    TSH Results:      Lab 07/07/21  0854   TSH 0.444*

## 2021-07-14 RX ORDER — LEVOTHYROXINE SODIUM 0.1 MG/1
100 TABLET ORAL EVERY MORNING
Qty: 90 TABLET | Refills: 0 | Status: SHIPPED | OUTPATIENT
Start: 2021-07-14 | End: 2021-11-15 | Stop reason: SDUPTHER

## 2021-09-01 ENCOUNTER — TELEPHONE (OUTPATIENT)
Dept: FAMILY MEDICINE CLINIC | Age: 61
End: 2021-09-01

## 2021-09-01 DIAGNOSIS — E03.9 HYPOTHYROIDISM, UNSPECIFIED TYPE: Primary | ICD-10-CM

## 2021-09-01 NOTE — TELEPHONE ENCOUNTER
----- Message from Randi Sears LPN sent at 7/12/2021 10:39 AM EDT -----  TSH 08/23/2021 per August labs./atc

## 2021-11-13 DIAGNOSIS — E78.2 MIXED HYPERLIPIDEMIA: ICD-10-CM

## 2021-11-15 DIAGNOSIS — E03.9 HYPOTHYROIDISM, UNSPECIFIED TYPE: ICD-10-CM

## 2021-11-15 DIAGNOSIS — I10 ESSENTIAL HYPERTENSION: ICD-10-CM

## 2021-11-15 DIAGNOSIS — E78.2 MIXED HYPERLIPIDEMIA: ICD-10-CM

## 2021-11-15 RX ORDER — LEVOTHYROXINE SODIUM 0.1 MG/1
100 TABLET ORAL EVERY MORNING
Qty: 90 TABLET | Refills: 0 | Status: SHIPPED | OUTPATIENT
Start: 2021-11-15 | End: 2021-11-15

## 2021-11-15 RX ORDER — HYDROCHLOROTHIAZIDE 12.5 MG/1
TABLET ORAL
Qty: 30 TABLET | Refills: 0 | Status: SHIPPED | OUTPATIENT
Start: 2021-11-15 | End: 2022-01-03 | Stop reason: SDUPTHER

## 2021-11-15 RX ORDER — HYDROCHLOROTHIAZIDE 12.5 MG/1
12.5 TABLET ORAL DAILY
Qty: 90 TABLET | Refills: 0 | Status: SHIPPED | OUTPATIENT
Start: 2021-11-15 | End: 2021-11-15

## 2021-11-15 RX ORDER — LEVOTHYROXINE SODIUM 100 UG/1
TABLET ORAL
Qty: 90 TABLET | Refills: 0 | Status: SHIPPED | OUTPATIENT
Start: 2021-11-15 | End: 2022-01-04 | Stop reason: DRUGHIGH

## 2021-11-15 RX ORDER — ROSUVASTATIN CALCIUM 10 MG/1
10 TABLET, COATED ORAL NIGHTLY
Qty: 90 TABLET | Refills: 0 | Status: SHIPPED | OUTPATIENT
Start: 2021-11-15 | End: 2022-01-03 | Stop reason: SDUPTHER

## 2021-11-15 RX ORDER — ROSUVASTATIN CALCIUM 10 MG/1
TABLET, COATED ORAL
Qty: 90 TABLET | Refills: 0 | OUTPATIENT
Start: 2021-11-15

## 2021-11-15 NOTE — TELEPHONE ENCOUNTER
Caller: Judith Fitzpatrick    Relationship: Self    Best call back number: [phone]     Requested Prescriptions:   Requested Prescriptions     Pending Prescriptions Disp Refills   • hydroCHLOROthiazide (HYDRODIURIL) 12.5 MG tablet 90 tablet 0     Sig: Take 1 tablet by mouth Daily for 90 days.   • levothyroxine (Levoxyl) 100 MCG tablet 90 tablet 0     Sig: Take 1 tablet by mouth Every Morning.   • rosuvastatin (Crestor) 10 MG tablet 90 tablet 0     Sig: Take 1 tablet by mouth Every Night for 90 days.        Pharmacy where request should be sent: [place] [id number] [address] [facility] [phone] [facility] [phone] FX       Does the patient have less than a 3 day supply:  [x] Yes  [] No    Siddharth Lea Rep   11/15/21 10:22 EST

## 2022-01-03 ENCOUNTER — OFFICE VISIT (OUTPATIENT)
Dept: FAMILY MEDICINE CLINIC | Age: 62
End: 2022-01-03

## 2022-01-03 VITALS
WEIGHT: 127.4 LBS | BODY MASS INDEX: 21.23 KG/M2 | HEART RATE: 70 BPM | HEIGHT: 65 IN | TEMPERATURE: 98.5 F | SYSTOLIC BLOOD PRESSURE: 133 MMHG | DIASTOLIC BLOOD PRESSURE: 83 MMHG

## 2022-01-03 DIAGNOSIS — I10 ESSENTIAL HYPERTENSION: Primary | ICD-10-CM

## 2022-01-03 DIAGNOSIS — E78.2 MIXED HYPERLIPIDEMIA: ICD-10-CM

## 2022-01-03 DIAGNOSIS — Z23 NEED FOR INFLUENZA VACCINATION: ICD-10-CM

## 2022-01-03 DIAGNOSIS — Z23 IMMUNIZATION DUE: ICD-10-CM

## 2022-01-03 DIAGNOSIS — E03.9 ACQUIRED HYPOTHYROIDISM: ICD-10-CM

## 2022-01-03 PROCEDURE — 99214 OFFICE O/P EST MOD 30 MIN: CPT | Performed by: NURSE PRACTITIONER

## 2022-01-03 PROCEDURE — 90686 IIV4 VACC NO PRSV 0.5 ML IM: CPT | Performed by: NURSE PRACTITIONER

## 2022-01-03 PROCEDURE — 90471 IMMUNIZATION ADMIN: CPT | Performed by: NURSE PRACTITIONER

## 2022-01-03 PROCEDURE — 0001A COVID-19 (PFIZER): CPT | Performed by: NURSE PRACTITIONER

## 2022-01-03 PROCEDURE — 91300 COVID-19 (PFIZER): CPT | Performed by: NURSE PRACTITIONER

## 2022-01-03 RX ORDER — HYDROCHLOROTHIAZIDE 12.5 MG/1
12.5 TABLET ORAL DAILY
Qty: 90 TABLET | Refills: 1 | Status: SHIPPED | OUTPATIENT
Start: 2022-01-03 | End: 2022-10-04

## 2022-01-03 RX ORDER — ROSUVASTATIN CALCIUM 10 MG/1
10 TABLET, COATED ORAL NIGHTLY
Qty: 90 TABLET | Refills: 0 | Status: SHIPPED | OUTPATIENT
Start: 2022-01-03 | End: 2022-02-18

## 2022-01-03 NOTE — PROGRESS NOTES
Judith Fitzpatrick presents to [facility].    Chief Complaint:  Hypothyroidism and refills of all her rx's          History of Present Illness:  Hypothyroidism  Current rx Euthyroid 100   Tolerating rx yes   Refills needed : [facility]   Lab Results       Component                Value               Date                       TSH                      0.626               09/13/2021              Hyperlipidemia  Current medication crestor   Tolerating medication: Yes  Needs Refill: Yes    Lab Results       Component                Value               Date                       CHLPL                    207 (H)             07/07/2021                 TRIG                     140                 07/07/2021                 HDL                      64                  07/07/2021                 LDL                      119 (H)             07/07/2021          Hypertension:  Current medication HCTZ  Tolerating Medication: Yes   Checking BP at home and it is : not checking   Needs refills: Yes  Labs   Lab Results       Component                Value               Date                       GLUCOSE                  109 (H)             07/07/2021                 BUN                      12                  07/07/2021                 CREATININE               0.86                07/07/2021                 EGFRIFNONA               73                  07/07/2021                 EGFRIFAFRI               84                  07/07/2021                 BCR                      14                  07/07/2021                 K                        4.6                 07/07/2021                 CO2                      26                  07/07/2021                 CALCIUM                  9.7                 07/07/2021                 PROTENTOTREF             7.2                 07/07/2021                 ALBUMIN                  4.7                 07/07/2021                 LABIL2                   1.9          "        2021                 AST                      16                  2021                 ALT                      14                  2021                Past Medical History:  No surgery or hospitalizations       GYNECOLOGICAL HISTORY:       PREVENTIVE HEALTH MAINTENANCE       Hepatitis C Medicare Screening: was last done 17; negative     Surgical History:   Surgeries:  Biopsy of breast; benign; 2004   Dilation and Curettage  Bilateral Tubal Ligation  BENIGN TUMOR REMOVED FROM NECK;  uterine ablation;   Procedures:  Colonoscopy (  ) heart cath normal 14     Family History:     Positive for Coronary Artery Disease and Hypertension.    Positive for Type 2 Diabetes and Hypothyroidism.  Father:  at age 70's; Cause of death was stomach cancer;  Hypertension;  Type 2 Diabetes   Mother: Hypertension; Hyperlipidemia     Social History:   Occupation: Retired (Prior occupation: [employer] )   Marital Status:    Children: 2 children, 3 step-children, and (2 grandchildren) step children (living with her)               Review of Systems:  Review of Systems   Constitutional: Negative for fatigue and fever.   Respiratory: Negative for cough and shortness of breath.    Cardiovascular: Negative for chest pain, palpitations and leg swelling.   Neurological: Negative for numbness.          Current Outpatient Medications:   •  Euthyrox 100 MCG tablet, Take 1 tablet by mouth once daily, Disp: 90 tablet, Rfl: 0  •  hydroCHLOROthiazide (HYDRODIURIL) 12.5 MG tablet, Take 1 tablet by mouth Daily., Disp: 90 tablet, Rfl: 1  •  rosuvastatin (Crestor) 10 MG tablet, Take 1 tablet by mouth Every Night for 90 days., Disp: 90 tablet, Rfl: 0    Vital Signs:   Vitals:    22 1149   BP: 133/83   BP Location: Right arm   Patient Position: Sitting   Pulse: 70   Temp: 98.5 °F (36.9 °C)   TempSrc: Oral   Weight: 57.8 kg (127 lb 6.4 oz)   Height: 165.1 cm (65\")         Physical Exam:  Physical " Exam  Vitals reviewed.   Constitutional:       General: She is not in acute distress.     Appearance: Normal appearance.   Neck:      Vascular: No carotid bruit.   Cardiovascular:      Rate and Rhythm: Normal rate and regular rhythm.      Heart sounds: Normal heart sounds. No murmur heard.      Pulmonary:      Effort: Pulmonary effort is normal. No respiratory distress.      Breath sounds: Normal breath sounds.   Musculoskeletal:      Right lower leg: No edema.      Left lower leg: No edema.   Neurological:      Mental Status: She is alert.   Psychiatric:         Mood and Affect: Mood normal.         Behavior: Behavior normal.         Result Review      The following data was reviewed by: SANDRO Ram on 01/03/2022:    Results for orders placed or performed in visit on 09/06/21   TSH    Specimen: Blood   Result Value Ref Range    TSH 0.626 0.450 - 4.500 uIU/mL               Assessment and Plan:          Diagnoses and all orders for this visit:    1. Essential hypertension (Primary)  Assessment & Plan:  Will update with flu and covid vaccines, she will go to [facility] for labs     Orders:  -     Cancel: TSH; Future  -     Cancel: Comprehensive Metabolic Panel  -     Cancel: Lipid Panel  -     Comprehensive Metabolic Panel; Future  -     Lipid Panel; Future  -     TSH; Future  -     hydroCHLOROthiazide (HYDRODIURIL) 12.5 MG tablet; Take 1 tablet by mouth Daily.  Dispense: 90 tablet; Refill: 1    2. Acquired hypothyroidism  Assessment & Plan:  Will refill rx after labs     Orders:  -     Cancel: TSH; Future  -     TSH; Future    3. Mixed hyperlipidemia  Assessment & Plan:  Continue current medication and efforts with diet and exercise.       Orders:  -     Cancel: Comprehensive Metabolic Panel  -     Cancel: Lipid Panel  -     Comprehensive Metabolic Panel; Future  -     Lipid Panel; Future  -     rosuvastatin (Crestor) 10 MG tablet; Take 1 tablet by mouth Every Night for 90 days.  Dispense: 90 tablet;  Refill: 0    4. Immunization due  Assessment & Plan:  Give her covid booster today       5. Need for influenza vaccination  -     FluLaval/Fluarix/Fluzone >6 Months        Follow Up   Return for followup pending lab results.  Patient was given instructions and counseling regarding her condition or for health maintenance advice. Please see specific information pulled into the AVS if appropriate.

## 2022-01-04 DIAGNOSIS — E03.9 ACQUIRED HYPOTHYROIDISM: Primary | ICD-10-CM

## 2022-01-04 LAB
ALBUMIN SERPL-MCNC: 4.7 G/DL (ref 3.8–4.8)
ALBUMIN/GLOB SERPL: 1.9 {RATIO} (ref 1.2–2.2)
ALP SERPL-CCNC: 88 IU/L (ref 44–121)
ALT SERPL-CCNC: 17 IU/L (ref 0–32)
AMBIG ABBREV CMP14 DEFAULT: NORMAL
AMBIG ABBREV LP DEFAULT: NORMAL
AST SERPL-CCNC: 18 IU/L (ref 0–40)
BILIRUB SERPL-MCNC: 0.3 MG/DL (ref 0–1.2)
BUN SERPL-MCNC: 12 MG/DL (ref 8–27)
BUN/CREAT SERPL: 15 (ref 12–28)
CALCIUM SERPL-MCNC: 9.7 MG/DL (ref 8.7–10.3)
CHLORIDE SERPL-SCNC: 102 MMOL/L (ref 96–106)
CHOLEST SERPL-MCNC: 184 MG/DL (ref 100–199)
CO2 SERPL-SCNC: 23 MMOL/L (ref 20–29)
CREAT SERPL-MCNC: 0.82 MG/DL (ref 0.57–1)
GLOBULIN SER CALC-MCNC: 2.5 G/DL (ref 1.5–4.5)
GLUCOSE SERPL-MCNC: 107 MG/DL (ref 65–99)
HDLC SERPL-MCNC: 59 MG/DL
LDLC SERPL CALC-MCNC: 97 MG/DL (ref 0–99)
POTASSIUM SERPL-SCNC: 4 MMOL/L (ref 3.5–5.2)
PROT SERPL-MCNC: 7.2 G/DL (ref 6–8.5)
SODIUM SERPL-SCNC: 142 MMOL/L (ref 134–144)
TRIGL SERPL-MCNC: 164 MG/DL (ref 0–149)
TSH SERPL DL<=0.005 MIU/L-ACNC: 0.17 UIU/ML (ref 0.45–4.5)
VLDLC SERPL CALC-MCNC: 28 MG/DL (ref 5–40)

## 2022-01-04 RX ORDER — LEVOTHYROXINE SODIUM 88 UG/1
88 TABLET ORAL DAILY
Qty: 90 TABLET | Refills: 0 | Status: SHIPPED | OUTPATIENT
Start: 2022-01-04 | End: 2022-04-12

## 2022-02-18 DIAGNOSIS — E78.2 MIXED HYPERLIPIDEMIA: ICD-10-CM

## 2022-02-18 RX ORDER — ROSUVASTATIN CALCIUM 10 MG/1
TABLET, COATED ORAL
Qty: 90 TABLET | Refills: 0 | Status: SHIPPED | OUTPATIENT
Start: 2022-02-18 | End: 2022-05-27

## 2022-02-28 ENCOUNTER — TELEPHONE (OUTPATIENT)
Dept: FAMILY MEDICINE CLINIC | Age: 62
End: 2022-02-28

## 2022-02-28 DIAGNOSIS — E03.9 ACQUIRED HYPOTHYROIDISM: Primary | ICD-10-CM

## 2022-02-28 NOTE — TELEPHONE ENCOUNTER
----- Message from Leyda Ware LPN sent at 1/4/2022 10:28 AM EST -----  Regarding: lab  TSH DUE MARCH 2 2022

## 2022-03-05 LAB — TSH SERPL DL<=0.005 MIU/L-ACNC: 1.44 UIU/ML (ref 0.45–4.5)

## 2022-03-23 ENCOUNTER — OFFICE VISIT (OUTPATIENT)
Dept: FAMILY MEDICINE CLINIC | Age: 62
End: 2022-03-23

## 2022-03-23 ENCOUNTER — HOSPITAL ENCOUNTER (OUTPATIENT)
Dept: GENERAL RADIOLOGY | Facility: HOSPITAL | Age: 62
Discharge: HOME | End: 2022-03-23
Admitting: NURSE PRACTITIONER

## 2022-03-23 VITALS
OXYGEN SATURATION: 98 % | HEART RATE: 93 BPM | SYSTOLIC BLOOD PRESSURE: 138 MMHG | HEIGHT: 65 IN | BODY MASS INDEX: 20.76 KG/M2 | DIASTOLIC BLOOD PRESSURE: 93 MMHG | TEMPERATURE: 98 F | WEIGHT: 124.6 LBS

## 2022-03-23 DIAGNOSIS — I10 ESSENTIAL HYPERTENSION: ICD-10-CM

## 2022-03-23 DIAGNOSIS — Z86.69 HISTORY OF HEARING LOSS: ICD-10-CM

## 2022-03-23 DIAGNOSIS — E03.9 ACQUIRED HYPOTHYROIDISM: ICD-10-CM

## 2022-03-23 DIAGNOSIS — R05.9 COUGH: Primary | ICD-10-CM

## 2022-03-23 DIAGNOSIS — R05.9 COUGH: ICD-10-CM

## 2022-03-23 LAB
EXPIRATION DATE: NORMAL
EXPIRATION DATE: NORMAL
FLUAV AG NPH QL: NEGATIVE
FLUBV AG NPH QL: NEGATIVE
INTERNAL CONTROL: NORMAL
INTERNAL CONTROL: NORMAL
Lab: NORMAL
Lab: NORMAL
SARS-COV-2 AG UPPER RESP QL IA.RAPID: NOT DETECTED

## 2022-03-23 PROCEDURE — 71046 X-RAY EXAM CHEST 2 VIEWS: CPT

## 2022-03-23 PROCEDURE — 87804 INFLUENZA ASSAY W/OPTIC: CPT | Performed by: NURSE PRACTITIONER

## 2022-03-23 PROCEDURE — 99214 OFFICE O/P EST MOD 30 MIN: CPT | Performed by: NURSE PRACTITIONER

## 2022-03-23 PROCEDURE — 87426 SARSCOV CORONAVIRUS AG IA: CPT | Performed by: NURSE PRACTITIONER

## 2022-03-23 RX ORDER — ALBUTEROL SULFATE 90 UG/1
2 AEROSOL, METERED RESPIRATORY (INHALATION) EVERY 6 HOURS PRN
Qty: 8 G | Refills: 1 | Status: SHIPPED | OUTPATIENT
Start: 2022-03-23

## 2022-03-23 NOTE — ASSESSMENT & PLAN NOTE
Rest, increase fluids, follow up if symptoms progress or change   covid screen: negative   Flu screen: negative   Try OTC allergy rx  Advised to quit smoking   She has a sister dg with pulm fibrosis, consider a pulm consult

## 2022-03-23 NOTE — ASSESSMENT & PLAN NOTE
Check on ENT, ultrasound and biopsy from past   Reviewed EMD: she last saw Dr Florez for hearing loss in [phone], he wanted to check her again in one year, her US of thyroid was 4-2014 and then 6-2014, biopsy that day: benign thyroid follicular nodule, features are suggestive of Hashimoto's thyroiditis , but not an optimal specimen   I will send her back to ENT for hearing loss follow up, but to endo for her thyroid

## 2022-03-23 NOTE — PROGRESS NOTES
Judith Fitzpatrick presents to [facility].    Chief Complaint:  Cough (Pt c/o runny nose, cough. Symptoms ongoing for a few days./)         History of Present Illness:  URI  When did symptoms started: a few days ago, 5 days ago   Any exposures:none that she is aware of   Symptoms: occ to dry cough, runny nose, clear, sneezing   Treatment tried: tylenol, sudafed and allergy pills   Smokes a half a pack a day      Past Medical History:  Since :  No surgery or hospitalizations         GYNECOLOGICAL HISTORY:        PREVENTIVE HEALTH MAINTENANCE         Hepatitis C Medicare Screening: was last done 17; negative      Surgical History:   Surgeries:  Biopsy of breast; benign;    Dilation and Curettage  Bilateral Tubal Ligation  BENIGN TUMOR REMOVED FROM NECK;  uterine ablation;   Procedures:  Colonoscopy (  ) heart cath normal 14      Family History:      Positive for Coronary Artery Disease and Hypertension.    Positive for Type 2 Diabetes and Hypothyroidism.    Father:  at age 70's; Cause of death was stomach cancer;  Hypertension;  Type 2 Diabetes   Mother: Hypertension; Hyperlipidemia, liver disorder    sister: 4, one with pulm fibrosis, MV, thyroid disorder    Social History:   Occupation: Retired (Prior occupation: [employer] )   Marital Status:    Children: 2 children, 3 step-children, and (2 grandchildren) step children (living with her)                  Review of Systems:  Review of Systems   Constitutional: Negative for fever.   HENT: Negative for sore throat.    Respiratory: Positive for wheezing (at night ). Negative for shortness of breath (2 days, but with exertion ).    Cardiovascular: Negative for chest pain.   Endocrine:        Worried about her thyroid, wants to check on follow up with an ENT or ultrasound    Neurological:        No loss of taste or smell           Current Outpatient Medications:   •  hydroCHLOROthiazide (HYDRODIURIL)  "12.5 MG tablet, Take 1 tablet by mouth Daily., Disp: 90 tablet, Rfl: 1  •  levothyroxine (Euthyrox) 88 MCG tablet, Take 1 tablet by mouth Daily., Disp: 90 tablet, Rfl: 0  •  rosuvastatin (CRESTOR) 10 MG tablet, TAKE 1 TABLET BY MOUTH ONCE DAILY AT NIGHT FOR 90 DAYS, Disp: 90 tablet, Rfl: 0  •  albuterol sulfate  (90 Base) MCG/ACT inhaler, Inhale 2 puffs Every 6 (Six) Hours As Needed for Wheezing., Disp: 8 g, Rfl: 1    Vital Signs:   Vitals:    03/23/22 1511   BP: 138/93   BP Location: Right arm   Patient Position: Sitting   Pulse: 93   Temp: 98 °F (36.7 °C)   TempSrc: Oral   SpO2: 98%  Comment: room air   Weight: 56.5 kg (124 lb 9.6 oz)   Height: 165.1 cm (65\")         Physical Exam:  Physical Exam  Constitutional:       General: She is not in acute distress.     Appearance: Normal appearance.   HENT:      Right Ear: Tympanic membrane, ear canal and external ear normal.      Left Ear: Tympanic membrane, ear canal and external ear normal.      Nose: Nose normal.      Mouth/Throat:      Pharynx: Oropharynx is clear. No posterior oropharyngeal erythema.   Cardiovascular:      Rate and Rhythm: Normal rate and regular rhythm.      Heart sounds: No murmur heard.  Pulmonary:      Effort: Pulmonary effort is normal.      Breath sounds: Wheezing (faint wheeze heard initially with inspiration in upper lobes ) present.   Musculoskeletal:      Cervical back: Neck supple.   Lymphadenopathy:      Cervical: No cervical adenopathy.   Neurological:      Mental Status: She is alert.   Psychiatric:         Mood and Affect: Mood normal.         Behavior: Behavior normal.         Result Review      The following data was reviewed by: SANDRO Ram on 03/23/2022:    Results for orders placed or performed in visit on 03/23/22   POCT Influenza A/B    Specimen: Swab   Result Value Ref Range    Rapid Influenza A Ag Negative Negative    Rapid Influenza B Ag Negative Negative    Internal Control Passed Passed    Lot Number " [id number]     Expiration Date 04/07/2022    POCT SARS-CoV-2 Antigen LATONYA    Specimen: Swab   Result Value Ref Range    SARS Antigen Not Detected Not Detected    Internal Control Passed Passed    Lot Number [id number]     Expiration Date 09/12/2023                Assessment and Plan:          Diagnoses and all orders for this visit:    1. Cough (Primary)  Assessment & Plan:  Rest, increase fluids, follow up if symptoms progress or change   covid screen: negative   Flu screen: negative   Try OTC allergy rx  Advised to quit smoking       Orders:  -     POCT Influenza A/B  -     POCT SARS-CoV-2 Antigen LATONYA  -     XR Chest 2 View; Future  -     albuterol sulfate  (90 Base) MCG/ACT inhaler; Inhale 2 puffs Every 6 (Six) Hours As Needed for Wheezing.  Dispense: 8 g; Refill: 1    2. Acquired hypothyroidism  Assessment & Plan:  Check on ENT, ultrasound and biopsy from past   Reviewed EMD: she last saw Dr Florez for hearing loss in [phone], he wanted to check her again in one year, her US of thyroid was 4-2014 and then 6-2014, biopsy that day: benign thyroid follicular nodule, features are suggestive of Hashimoto's thyroiditis , but not an optimal specimen   I will send her back to ENT for hearing loss follow up, but to endo for her thyroid     Orders:  -     Ambulatory Referral to Endocrinology    3. History of hearing loss  -     Ambulatory Referral to ENT (Otolaryngology)        Follow Up   Return if symptoms worsen or fail to improve.  Patient was given instructions and counseling regarding her condition or for health maintenance advice. Please see specific information pulled into the AVS if appropriate.

## 2022-03-24 ENCOUNTER — TELEPHONE (OUTPATIENT)
Dept: FAMILY MEDICINE CLINIC | Age: 62
End: 2022-03-24

## 2022-03-24 DIAGNOSIS — Z12.2 SCREENING FOR LUNG CANCER: ICD-10-CM

## 2022-03-24 DIAGNOSIS — R05.9 COUGH: Primary | ICD-10-CM

## 2022-03-24 RX ORDER — BENZONATATE 200 MG/1
CAPSULE ORAL
Qty: 30 CAPSULE | Refills: 0 | Status: SHIPPED | OUTPATIENT
Start: 2022-03-24

## 2022-03-24 RX ORDER — HYDROCHLOROTHIAZIDE 12.5 MG/1
TABLET ORAL
Qty: 30 TABLET | Refills: 0 | OUTPATIENT
Start: 2022-03-24

## 2022-04-05 PROBLEM — Z23 IMMUNIZATION DUE: Status: RESOLVED | Noted: 2022-01-03 | Resolved: 2022-04-05

## 2022-04-05 PROBLEM — R05.9 COUGH: Status: RESOLVED | Noted: 2021-06-23 | Resolved: 2022-04-05

## 2022-04-05 PROBLEM — E03.9 HYPOTHYROIDISM: Status: ACTIVE | Noted: 2022-04-05

## 2022-04-05 PROBLEM — Z23 NEED FOR INFLUENZA VACCINATION: Status: RESOLVED | Noted: 2022-01-03 | Resolved: 2022-04-05

## 2022-04-05 PROBLEM — Z86.69 HISTORY OF HEARING LOSS: Status: RESOLVED | Noted: 2022-03-23 | Resolved: 2022-04-05

## 2022-04-12 DIAGNOSIS — E03.9 ACQUIRED HYPOTHYROIDISM: ICD-10-CM

## 2022-04-12 RX ORDER — LEVOTHYROXINE SODIUM 88 UG/1
TABLET ORAL
Qty: 90 TABLET | Refills: 0 | Status: SHIPPED | OUTPATIENT
Start: 2022-04-12 | End: 2022-07-11

## 2022-04-18 NOTE — PROGRESS NOTES
Judith Fitzpatrick, 61 y.o., Gender: female    Assessment/Plan    1.  Actually the question in this pt is about report of nodular goiter, though there is no documented nodular goiter as have no image data on file as what was done about 10 years ago was outside this system.  What is needed is an ultrasound to make determination if there is thyroid nodules are not.  Will also obtain labs related to cause of hypothyroidism as that can be part of what was noted in the past as far as how the thyroid appeared.  Will await this information and then be in contact with pt as to what if anything needs to be done.  As pt's primary c/o has been swallowing difficulty for some time, that is likely not related to the thyroid and once have imaging back and can confirm that, pt might need to return to ENT for proper evaluation of that c/o as does not sound like that was ever done in the past.    2.  Pt w/ hypothyroidism secondary Hashimoto's dz confirmed by lab after visit.  TSH right at goal recently but was to low previously.  Counseled that the goal of tx will be to keep TSH 1.5-3.5 uIU/mL, which is the rec range for someone on tx at this age.  Rec cont current tx w/ 88 mcg daily.  Pt states was never told how to take medication correctly and only recently through a Facebook group did she find out the correct way to take the medication.  That likely explains the varying TSH results in the past 1-2 yrs at least.  Pt just started doing this correctly early this month and that will take 2-3 months before recheck would be done to determine if current dose is correct.  If the TSH would be below goal again after pt has been taking correctly, then that would indicated need to lower dose to 75 mcg.  For now pointing this out as ultimately this was not the primary question for the visit and pt's PCP likely will be following up on this.  Counseled pt about dose and to have f/u tft's to show being at goal range will then proceed from there w/  other f/u if needed to optimize dose.    3.  Counseled in detail proper way to take thyroid replacement treatment to be as follows.  Pt is to take first thing in the morning on an empty stomach with a large glass of water ONLY. No milk or any juice product. Do not take any other medications or eat anything for 45-60 minutes after taking the medication. You can eat, drink, and take other medications after 45-60 minutes.  Do not take any supplements for at least 3 hours after taking the medication.  Calcium and iron should be taken later in day if possible.  Also, medications to treat excess gastric acid should be taken later in the day.      Time Counseled: 20  Minutes  Total Time: 35  Minutes    Return to office in:   pending imaging and labs      HPI Summary    Pt here for evaluation of reported nodular goiter without documented proof and hypothyroidism.  Pt reports for about 10 yrs has had a swallowing problem mostly to solids and usually worse in the evening time.  Pt also reports occ shortness of breath when supine also in the evening time and while sleeping.  Pt reports those c/o are why she saw ENT in '14 and that she is not sure they ever looked into the swallowing problem as the focus was on the thyroid and report of thyroid nodules and having a biopsy.  Pt reports was never clear of the outcome of that and can't remember if they said it had anything to do with the main c/o about swallowing difficultly.  Pt reports did not have f/u from there and only recently brought this up with new PCP which lead to pt being sent here.  Pt reports has been on replacement for thyroid since around 31 yo, but only in the past 3-4 weeks did she find out how to correctly take the thyroid medication based on a Facebook group.  Pt reports she did change what she was doing as previously had taken in AM with all other meds and AM meal.  Pt reports baseline cold intolerance, baseline poor sleep, fatigue, occ inc heart rate, occ  joint pain, occ muscle ache, occ mental slowness, and dry skin.  At this time, pt denies any heat intolerance, excessive sweating, weight change, bowel problems, tremor, anxiety, muscle weakness, edema, excessive hair loss, or change in voice.  Pt denies history of radiation exposure to head or neck prior to age of 21 yo.  Pt without any other complaints related to thyroid at this time.    Review of Systems  see HPI    Patient History    Past History (reviewed):    Medical:   Past Medical History:   Diagnosis Date   • Chronic cough    • Hearing loss    • Hypertension    • Hypothyroidism 1990    estimated time frame, pt notes was well after she had her children   • Insomnia    • Mixed hyperlipidemia    • Nicotine dependence 1979   • Nodular goiter 2014    noted in outside records but have no documented images showing what is present   • Swallowing difficulty 2012    estimated time frame states was main reason had initial thyroid US in '14 but this problem was never addressed or evaluated itself   • Tinnitus, left ear        Surgery:   Past Surgical History:   Procedure Laterality Date   • BREAST BIOPSY  2004    benign    • CARDIAC CATHETERIZATION     • COLONOSCOPY  2012   • D & C AND LAPAROSCOPY     • DILATION AND CURETTAGE, DIAGNOSTIC / THERAPEUTIC     • ENDOMETRIAL ABLATION     • EXCISION TUMOR NECK / THORAX  1975    reports was a cyst that was drianed, likely a congenital item   • TUBAL ABDOMINAL LIGATION           Social History (reviewed):  Smoking 0.5 ppd 45 + yrs, rare ETOH, - Drugs, , Occupation retired [employer]    Medication List:  Current medications were reviewed.    Allergies:    Allergies   Allergen Reactions   • Atorvastatin Unknown - High Severity       Physical Exam    VITALS:    Vitals:    04/26/22 0857   BP: 137/86   Pulse: 77   Resp: 20   SpO2: 97%     Body mass index is 20.8 kg/m².    GENERAL:  Looks stated age, Well developed  HEAD/EYES:  N/C, A/T, Mask in place  EXTREMITIES:  FROM  CNS:   A&Ox3    Full exam not done today      Labs/Imaging  All available lab and imaging data were reviewed.      Satnam Elena MD, JABARI, FACE, ECNU  4/26/2022  09:34 EDT

## 2022-04-26 ENCOUNTER — OFFICE VISIT (OUTPATIENT)
Dept: ENDOCRINOLOGY | Age: 62
End: 2022-04-26

## 2022-04-26 VITALS
WEIGHT: 125 LBS | HEART RATE: 77 BPM | RESPIRATION RATE: 20 BRPM | SYSTOLIC BLOOD PRESSURE: 137 MMHG | BODY MASS INDEX: 20.83 KG/M2 | OXYGEN SATURATION: 97 % | HEIGHT: 65 IN | DIASTOLIC BLOOD PRESSURE: 86 MMHG

## 2022-04-26 DIAGNOSIS — E04.9 NODULAR GOITER: Primary | ICD-10-CM

## 2022-04-26 DIAGNOSIS — R13.10 DYSPHAGIA, UNSPECIFIED TYPE: ICD-10-CM

## 2022-04-26 DIAGNOSIS — E03.9 HYPOTHYROIDISM, UNSPECIFIED TYPE: ICD-10-CM

## 2022-04-26 PROCEDURE — 99203 OFFICE O/P NEW LOW 30 MIN: CPT | Performed by: INTERNAL MEDICINE

## 2022-04-26 NOTE — PATIENT INSTRUCTIONS
As discussed the prior mention of thyroid nodules cannot be confirmed as have no image on file.  Will need to obtain a thyroid ultrasound to know what is present.  That will be done here and they will contact you separately about that.  Will also obtain labs for confirmation of hypothyroidism process as that can be part of the changes mentioned in the past.      As replacement is adequate at this time will await results of these items to determine what needs to be done next.  As discussed now that  you are taking medication correctly will be 2-3 months, so around late June, when labs should be checked again to see if the dose is correct or if the dose needs to be lowered to 75 mcg.  That can be done with your PCP as have noted all the information for that.      Will see what comes of the imaging and decide from there if there is need to return here or if you would need to see ENT again for the swallowing complaint.

## 2022-04-28 LAB
THYROGLOB AB SERPL-ACNC: <1 IU/ML (ref 0–0.9)
THYROPEROXIDASE AB SERPL-ACNC: 56 IU/ML (ref 0–34)

## 2022-05-06 ENCOUNTER — APPOINTMENT (OUTPATIENT)
Dept: CT IMAGING | Facility: HOSPITAL | Age: 62
End: 2022-05-06

## 2022-05-26 ENCOUNTER — HOSPITAL ENCOUNTER (OUTPATIENT)
Dept: ULTRASOUND IMAGING | Facility: HOSPITAL | Age: 62
Discharge: HOME | End: 2022-05-26
Admitting: INTERNAL MEDICINE

## 2022-05-26 DIAGNOSIS — E04.9 NODULAR GOITER: ICD-10-CM

## 2022-05-26 PROCEDURE — 76536 US EXAM OF HEAD AND NECK: CPT

## 2022-05-27 DIAGNOSIS — E78.2 MIXED HYPERLIPIDEMIA: ICD-10-CM

## 2022-05-27 RX ORDER — ROSUVASTATIN CALCIUM 10 MG/1
TABLET, COATED ORAL
Qty: 90 TABLET | Refills: 0 | Status: SHIPPED | OUTPATIENT
Start: 2022-05-27 | End: 2022-08-31

## 2022-06-16 ENCOUNTER — HOSPITAL ENCOUNTER (OUTPATIENT)
Dept: CT IMAGING | Facility: HOSPITAL | Age: 62
Discharge: HOME | End: 2022-06-16
Admitting: NURSE PRACTITIONER

## 2022-06-16 DIAGNOSIS — R05.9 COUGH: ICD-10-CM

## 2022-06-16 DIAGNOSIS — Z12.2 SCREENING FOR LUNG CANCER: ICD-10-CM

## 2022-06-16 PROCEDURE — 71271 CT THORAX LUNG CANCER SCR C-: CPT

## 2022-06-29 DIAGNOSIS — E03.9 HYPOTHYROIDISM, UNSPECIFIED TYPE: Primary | ICD-10-CM

## 2022-07-08 DIAGNOSIS — E03.9 ACQUIRED HYPOTHYROIDISM: ICD-10-CM

## 2022-07-11 RX ORDER — LEVOTHYROXINE SODIUM 88 UG/1
TABLET ORAL
Qty: 90 TABLET | Refills: 0 | Status: SHIPPED | OUTPATIENT
Start: 2022-07-11 | End: 2022-10-11 | Stop reason: SDUPTHER

## 2022-07-19 ENCOUNTER — TELEPHONE (OUTPATIENT)
Dept: FAMILY MEDICINE CLINIC | Age: 62
End: 2022-07-19

## 2022-07-19 NOTE — TELEPHONE ENCOUNTER
----- Message from Leyda Ware LPN sent at 7/12/2022  8:04 AM EDT -----    ----- Message -----  From: SYSTEM  Sent: 7/12/2022   1:19 AM EDT  To: [facility]

## 2022-07-19 NOTE — TELEPHONE ENCOUNTER
7/19/22 spoke with pt re overdue labs. Pt states she completes these at [facility]. Printed off a copy and placed up front for . -1st attempt

## 2022-07-28 LAB — TSH SERPL DL<=0.005 MIU/L-ACNC: 1.95 UIU/ML (ref 0.45–4.5)

## 2022-08-31 DIAGNOSIS — E78.2 MIXED HYPERLIPIDEMIA: ICD-10-CM

## 2022-08-31 RX ORDER — ROSUVASTATIN CALCIUM 10 MG/1
TABLET, COATED ORAL
Qty: 30 TABLET | Refills: 0 | Status: SHIPPED | OUTPATIENT
Start: 2022-08-31 | End: 2022-10-03

## 2022-10-02 DIAGNOSIS — E78.2 MIXED HYPERLIPIDEMIA: ICD-10-CM

## 2022-10-02 DIAGNOSIS — I10 ESSENTIAL HYPERTENSION: ICD-10-CM

## 2022-10-03 RX ORDER — ROSUVASTATIN CALCIUM 10 MG/1
TABLET, COATED ORAL
Qty: 30 TABLET | Refills: 0 | Status: SHIPPED | OUTPATIENT
Start: 2022-10-03 | End: 2022-11-01

## 2022-10-03 NOTE — TELEPHONE ENCOUNTER
Rx Refill Note  Requested Prescriptions     Pending Prescriptions Disp Refills   • hydroCHLOROthiazide (HYDRODIURIL) 12.5 MG tablet [Pharmacy Med Name: hydroCHLOROthiazide 12.5 MG Oral Tablet] 30 tablet 0     Sig: Take 1 tablet by mouth once daily   • rosuvastatin (CRESTOR) 10 MG tablet [Pharmacy Med Name: Rosuvastatin Calcium 10 MG Oral Tablet] 30 tablet 0     Sig: TAKE 1 TABLET BY MOUTH ONCE DAILY AT NIGHT      Last office visit with prescribing clinician: 3/23/2022      Next office visit with prescribing clinician: Visit date not found  Comprehensive Metabolic Panel (01/03/2022 13:14)    Leyda Ware LPN  10/03/22, 08:44 EDT

## 2022-10-04 RX ORDER — HYDROCHLOROTHIAZIDE 12.5 MG/1
12.5 TABLET ORAL DAILY
Qty: 30 TABLET | Refills: 0 | Status: SHIPPED | OUTPATIENT
Start: 2022-10-04 | End: 2022-11-01

## 2022-10-04 NOTE — TELEPHONE ENCOUNTER
Left a message for her to call and schedule a f/u appt with A Tracey. One month of hctz sent to pharmacy with a note also,  needing appt.

## 2022-10-11 ENCOUNTER — OFFICE VISIT (OUTPATIENT)
Dept: FAMILY MEDICINE CLINIC | Age: 62
End: 2022-10-11

## 2022-10-11 VITALS
WEIGHT: 123.8 LBS | HEIGHT: 65 IN | DIASTOLIC BLOOD PRESSURE: 93 MMHG | SYSTOLIC BLOOD PRESSURE: 157 MMHG | HEART RATE: 70 BPM | OXYGEN SATURATION: 98 % | BODY MASS INDEX: 20.62 KG/M2 | RESPIRATION RATE: 17 BRPM

## 2022-10-11 DIAGNOSIS — Z23 NEED FOR VACCINATION: ICD-10-CM

## 2022-10-11 DIAGNOSIS — E03.9 ACQUIRED HYPOTHYROIDISM: ICD-10-CM

## 2022-10-11 DIAGNOSIS — E78.2 MIXED HYPERLIPIDEMIA: ICD-10-CM

## 2022-10-11 DIAGNOSIS — I10 ESSENTIAL HYPERTENSION: Primary | ICD-10-CM

## 2022-10-11 DIAGNOSIS — Z23 IMMUNIZATION DUE: ICD-10-CM

## 2022-10-11 PROCEDURE — 90686 IIV4 VACC NO PRSV 0.5 ML IM: CPT | Performed by: NURSE PRACTITIONER

## 2022-10-11 PROCEDURE — 0124A COVID-19 (PFIZER) BIVALENT BOOSTER 12+YRS: CPT | Performed by: NURSE PRACTITIONER

## 2022-10-11 PROCEDURE — 99214 OFFICE O/P EST MOD 30 MIN: CPT | Performed by: NURSE PRACTITIONER

## 2022-10-11 PROCEDURE — 90471 IMMUNIZATION ADMIN: CPT | Performed by: NURSE PRACTITIONER

## 2022-10-11 PROCEDURE — 91312 COVID-19 (PFIZER) BIVALENT BOOSTER 12+YRS: CPT | Performed by: NURSE PRACTITIONER

## 2022-10-11 RX ORDER — LEVOTHYROXINE SODIUM 88 UG/1
88 TABLET ORAL DAILY
Qty: 90 TABLET | Refills: 0 | Status: SHIPPED | OUTPATIENT
Start: 2022-10-11 | End: 2022-12-06 | Stop reason: DRUGHIGH

## 2022-10-11 NOTE — ASSESSMENT & PLAN NOTE
BP up, recheck bp,   to monitor BP at home. Continue current med. Continue to modify diet and lifestyle. Will need labs every 6 months and follow up.

## 2022-10-11 NOTE — PROGRESS NOTES
Judith Fitzpatrick presents to [facility].    Chief Complaint:  Hypertension, Hypothyroidism, Hyperlipidemia, and Follow-up (6 month)         History of Present Illness:  Hyperlipidemia  Current medication: crestor   Tolerating medication: Yes  Needs Refill: Yes she will, just got a 30 day supply     Lab Results       Component                Value               Date                       CHLPL                    184                 01/03/2022                 TRIG                     164 (H)             01/03/2022                 HDL                      59                  01/03/2022                 LDL                      97                  01/03/2022              Hypothyroidism  Current rx: euthyrox 88   Tolerating rx: yes   Refills needed Yes, took her last dose last night   Lab Results       Component                Value               Date                       TSH                      1.950               07/27/2022              Hypertension:  Current medication: HCTZ   Tolerating Medication: Yes  Checking BP at home and it is: not checking   Needs refills: Yes, she will, just got a 30 day supply   Labs:  Lab Results       Component                Value               Date                       GLUCOSE                  107 (H)             01/03/2022                 BUN                      12                  01/03/2022                 CREATININE               0.82                01/03/2022                 EGFRIFNONA               77                  01/03/2022                 EGFRIFAFRI               89                  01/03/2022                 BCR                      15                  01/03/2022                 K                        4.0                 01/03/2022                 CO2                      23                  01/03/2022                 CALCIUM                  9.7                 01/03/2022                 PROTENTOTREF             7.2                  2022                 ALBUMIN                  4.7                 2022                 LABIL2                   1.9                 2022                 AST                      18                  2022                 ALT                      17                  2022             Past Medical History:  Changes since 3-2022:        thinks she had covid   GYNECOLOGICAL HISTORY:        PREVENTIVE HEALTH MAINTENANCE         Hepatitis C Medicare Screening: was last done 17; negative      Surgical History:   Surgeries:  Biopsy of breast; benign;    Dilation and Curettage  Bilateral Tubal Ligation  BENIGN TUMOR REMOVED FROM NECK;  uterine ablation;   Procedures:  Colonoscopy (  ) heart cath normal 14      Family History:      Positive for Coronary Artery Disease and Hypertension.    Positive for Type 2 Diabetes and Hypothyroidism.    Father:  at age 70's; Cause of death was stomach cancer;  Hypertension;  Type 2 Diabetes   Mother: Hypertension; Hyperlipidemia, liver disorder    sister: 4, one with pulm fibrosis, MV, thyroid disorder     Social History:   Occupation: Retired (Prior occupation: [employer] )   Marital Status:    Children: 2 children, 3 step-children, and (2 grandchildren) step children (living with her)                Review of Systems:  Review of Systems   Constitutional: Negative for fatigue and fever.   Respiratory: Negative for cough and shortness of breath.    Cardiovascular: Negative for chest pain, palpitations and leg swelling.   Neurological: Negative for numbness.          Current Outpatient Medications:   •  albuterol sulfate  (90 Base) MCG/ACT inhaler, Inhale 2 puffs Every 6 (Six) Hours As Needed for Wheezing., Disp: 8 g, Rfl: 1  •  benzonatate (TESSALON) 200 MG capsule, Take one every 8 hours as needed for cough., Disp: 30 capsule, Rfl: 0  •  hydroCHLOROthiazide (HYDRODIURIL) 12.5 MG tablet, Take 1 tablet by mouth  "Daily. *Call office to schedule appt*, Disp: 30 tablet, Rfl: 0  •  levothyroxine (Euthyrox) 88 MCG tablet, Take 1 tablet by mouth Daily., Disp: 90 tablet, Rfl: 0  •  rosuvastatin (CRESTOR) 10 MG tablet, TAKE 1 TABLET BY MOUTH ONCE DAILY AT NIGHT, Disp: 30 tablet, Rfl: 0    Vital Signs:   Vitals:    10/11/22 1039 10/11/22 1045   BP: 155/97 157/93   BP Location: Right arm Right arm   Patient Position: Sitting Sitting   Cuff Size: Adult Adult   Pulse: 70    Resp: 17    SpO2: 98%  Comment: room air    Weight: 56.2 kg (123 lb 12.8 oz)    Height: 165.1 cm (65\")    PainSc: 0-No pain          Physical Exam:  Physical Exam  Vitals reviewed.   Constitutional:       General: She is not in acute distress.     Appearance: Normal appearance.   Neck:      Vascular: No carotid bruit.   Cardiovascular:      Rate and Rhythm: Normal rate and regular rhythm.      Heart sounds: Normal heart sounds. No murmur heard.  Pulmonary:      Effort: Pulmonary effort is normal. No respiratory distress.      Breath sounds: Normal breath sounds.   Musculoskeletal:      Right lower leg: No edema.      Left lower leg: No edema.   Neurological:      Mental Status: She is alert.   Psychiatric:         Mood and Affect: Mood normal.         Behavior: Behavior normal.         Result Review      The following data was reviewed by: SANDRO Ram on 10/11/2022:    Results for orders placed or performed in visit on 07/27/22   TSH   Result Value Ref Range    TSH 1.950 0.450 - 4.500 uIU/mL               Assessment and Plan:          Diagnoses and all orders for this visit:    1. Essential hypertension (Primary)  Assessment & Plan:  BP up, recheck bp,   to monitor BP at home. Continue current med. Continue to modify diet and lifestyle. Will need labs every 6 months and follow up.       Orders:  -     Comprehensive Metabolic Panel; Future  -     Lipid Panel; Future    2. Mixed hyperlipidemia  Assessment & Plan:  Continue current medication and efforts " with diet and exercise.       Orders:  -     Comprehensive Metabolic Panel; Future  -     Lipid Panel; Future    3. Acquired hypothyroidism  Assessment & Plan:  Reviewed labs with pt, will send in her refill, she will go tomorrow to [facility]     Orders:  -     TSH Rfx On Abnormal To Free T4; Future  -     levothyroxine (Euthyrox) 88 MCG tablet; Take 1 tablet by mouth Daily.  Dispense: 90 tablet; Refill: 0    4. Immunization due  Assessment & Plan:  Will update her with flu and covid vaccines today and discussed getting shingles vaccine to check with pharm    Orders:  -     FluLaval/Fluzone >6 mos ([id number])    5. Need for vaccination  -     COVID-19 Bivalent Booster (Pfizer) 12+yrs        Follow Up   Return for followup pending lab results.  Patient was given instructions and counseling regarding her condition or for health maintenance advice. Please see specific information pulled into the AVS if appropriate.

## 2022-10-11 NOTE — ASSESSMENT & PLAN NOTE
Will update her with flu and covid vaccines today and discussed getting shingles vaccine to check with pharm

## 2022-10-13 LAB
ALBUMIN SERPL-MCNC: 4.6 G/DL (ref 3.8–4.8)
ALBUMIN/GLOB SERPL: 1.8 {RATIO} (ref 1.2–2.2)
ALP SERPL-CCNC: 106 IU/L (ref 44–121)
ALT SERPL-CCNC: 14 IU/L (ref 0–32)
AMBIG ABBREV CMP14 DEFAULT: NORMAL
AMBIG ABBREV LP DEFAULT: NORMAL
AST SERPL-CCNC: 21 IU/L (ref 0–40)
BILIRUB SERPL-MCNC: 0.3 MG/DL (ref 0–1.2)
BUN SERPL-MCNC: 12 MG/DL (ref 8–27)
BUN/CREAT SERPL: 16 (ref 12–28)
CALCIUM SERPL-MCNC: 9.7 MG/DL (ref 8.7–10.3)
CHLORIDE SERPL-SCNC: 100 MMOL/L (ref 96–106)
CHOLEST SERPL-MCNC: 179 MG/DL (ref 100–199)
CO2 SERPL-SCNC: 25 MMOL/L (ref 20–29)
CREAT SERPL-MCNC: 0.77 MG/DL (ref 0.57–1)
EGFRCR SERPLBLD CKD-EPI 2021: 87 ML/MIN/1.73
GLOBULIN SER CALC-MCNC: 2.6 G/DL (ref 1.5–4.5)
GLUCOSE SERPL-MCNC: 99 MG/DL (ref 70–99)
HDLC SERPL-MCNC: 59 MG/DL
LDLC SERPL CALC-MCNC: 98 MG/DL (ref 0–99)
POTASSIUM SERPL-SCNC: 4.4 MMOL/L (ref 3.5–5.2)
PROT SERPL-MCNC: 7.2 G/DL (ref 6–8.5)
SODIUM SERPL-SCNC: 140 MMOL/L (ref 134–144)
T4 FREE SERPL-MCNC: 1.31 NG/DL (ref 0.82–1.77)
TRIGL SERPL-MCNC: 124 MG/DL (ref 0–149)
TSH SERPL DL<=0.005 MIU/L-ACNC: 7.67 UIU/ML (ref 0.45–4.5)
VLDLC SERPL CALC-MCNC: 22 MG/DL (ref 5–40)

## 2022-10-14 DIAGNOSIS — E03.9 HYPOTHYROIDISM, UNSPECIFIED TYPE: Primary | ICD-10-CM

## 2022-11-01 DIAGNOSIS — E78.2 MIXED HYPERLIPIDEMIA: ICD-10-CM

## 2022-11-01 DIAGNOSIS — I10 ESSENTIAL HYPERTENSION: ICD-10-CM

## 2022-11-01 RX ORDER — HYDROCHLOROTHIAZIDE 12.5 MG/1
TABLET ORAL
Qty: 30 TABLET | Refills: 0 | Status: SHIPPED | OUTPATIENT
Start: 2022-11-01 | End: 2022-11-23 | Stop reason: SDUPTHER

## 2022-11-01 RX ORDER — ROSUVASTATIN CALCIUM 10 MG/1
TABLET, COATED ORAL
Qty: 30 TABLET | Refills: 0 | Status: SHIPPED | OUTPATIENT
Start: 2022-11-01 | End: 2022-11-29

## 2022-11-23 ENCOUNTER — TELEPHONE (OUTPATIENT)
Dept: FAMILY MEDICINE CLINIC | Age: 62
End: 2022-11-23

## 2022-11-23 DIAGNOSIS — I10 ESSENTIAL HYPERTENSION: ICD-10-CM

## 2022-11-23 RX ORDER — HYDROCHLOROTHIAZIDE 12.5 MG/1
12.5 TABLET ORAL DAILY
Qty: 90 TABLET | Refills: 1 | Status: SHIPPED | OUTPATIENT
Start: 2022-11-23

## 2022-11-23 NOTE — TELEPHONE ENCOUNTER
Rx Refill Note  Requested Prescriptions     Pending Prescriptions Disp Refills   • hydroCHLOROthiazide (HYDRODIURIL) 12.5 MG tablet 90 tablet 1     Sig: Take 1 tablet by mouth Daily.      Last office visit with prescribing clinician: 10/11/2022      Next office visit with prescribing clinician: 4/12/2023  Comprehensive Metabolic Panel (10/12/2022 10:07)    Leyda Ware LPN  11/23/22, 09:45 EST

## 2022-11-29 DIAGNOSIS — E78.2 MIXED HYPERLIPIDEMIA: ICD-10-CM

## 2022-11-29 RX ORDER — ROSUVASTATIN CALCIUM 10 MG/1
TABLET, COATED ORAL
Qty: 30 TABLET | Refills: 0 | Status: SHIPPED | OUTPATIENT
Start: 2022-11-29 | End: 2022-12-29

## 2022-12-01 ENCOUNTER — PATIENT MESSAGE (OUTPATIENT)
Dept: FAMILY MEDICINE CLINIC | Age: 62
End: 2022-12-01

## 2022-12-03 LAB — TSH SERPL DL<=0.005 MIU/L-ACNC: 5.05 UIU/ML (ref 0.45–4.5)

## 2022-12-06 DIAGNOSIS — E03.9 HYPOTHYROIDISM, UNSPECIFIED TYPE: Primary | ICD-10-CM

## 2022-12-06 RX ORDER — LEVOTHYROXINE SODIUM 0.1 MG/1
100 TABLET ORAL DAILY
Qty: 90 TABLET | Refills: 0 | Status: SHIPPED | OUTPATIENT
Start: 2022-12-06 | End: 2023-03-13

## 2022-12-29 DIAGNOSIS — E78.2 MIXED HYPERLIPIDEMIA: ICD-10-CM

## 2022-12-29 RX ORDER — ROSUVASTATIN CALCIUM 10 MG/1
TABLET, COATED ORAL
Qty: 90 TABLET | Refills: 1 | Status: SHIPPED | OUTPATIENT
Start: 2022-12-29

## 2022-12-29 NOTE — TELEPHONE ENCOUNTER
Rx Refill Note  Requested Prescriptions     Pending Prescriptions Disp Refills   • rosuvastatin (CRESTOR) 10 MG tablet [Pharmacy Med Name: Rosuvastatin Calcium 10 MG Oral Tablet] 30 tablet 0     Sig: TAKE 1 TABLET BY MOUTH ONCE DAILY AT NIGHT      Last office visit with prescribing clinician: 10/11/2022   Last telemedicine visit with prescribing clinician: 4/12/2023   Next office visit with prescribing clinician: 4/12/2023  Lipid Panel (10/12/2022 10:07)      Leyda Ware LPN  12/29/22, 10:31 EST

## 2023-01-09 ENCOUNTER — TELEPHONE (OUTPATIENT)
Dept: FAMILY MEDICINE CLINIC | Age: 63
End: 2023-01-09
Payer: COMMERCIAL

## 2023-01-09 NOTE — TELEPHONE ENCOUNTER
----- Message from Britt Vuong LPN sent at 1/6/2023  8:06 AM EST -----      ----- Message -----  From: SYSTEM  Sent: 1/6/2023   1:14 AM EST  To: [facility] [place] [facility] Salcedo [facility]

## 2023-02-01 ENCOUNTER — TELEPHONE (OUTPATIENT)
Dept: FAMILY MEDICINE CLINIC | Age: 63
End: 2023-02-01
Payer: COMMERCIAL

## 2023-02-01 NOTE — TELEPHONE ENCOUNTER
Caller: Judith Fitzpatrick    Relationship: Self    Best call back number: [phone]    What is the best time to reach you: ANY    Who are you requesting to speak with (clinical staff, provider,  specific staff member): CLINICAL    What was the call regarding: PATIENT CALLED AND STATED THAT SHE NEEDS HER LAB ORDERS SENT TO [facility] BECAUSE THAT IS WHERE SHE GETS THEM DONE. PATIENT STATED THAT SHE DOES NOT HAVE A FAX NUMBER FOR THEM. PATIENT STATED THAT SHE HAS ACTIVE LAB ORDERS ALREADY.    Do you require a callback: YES

## 2023-02-03 LAB — TSH SERPL DL<=0.005 MIU/L-ACNC: 4.2 UIU/ML (ref 0.45–4.5)

## 2023-03-13 DIAGNOSIS — E03.9 HYPOTHYROIDISM, UNSPECIFIED TYPE: ICD-10-CM

## 2023-03-13 RX ORDER — LEVOTHYROXINE SODIUM 0.1 MG/1
TABLET ORAL
Qty: 90 TABLET | Refills: 0 | Status: SHIPPED | OUTPATIENT
Start: 2023-03-13

## 2023-04-11 ENCOUNTER — OFFICE VISIT (OUTPATIENT)
Dept: FAMILY MEDICINE CLINIC | Age: 63
End: 2023-04-11
Payer: COMMERCIAL

## 2023-04-11 VITALS
DIASTOLIC BLOOD PRESSURE: 85 MMHG | SYSTOLIC BLOOD PRESSURE: 137 MMHG | HEIGHT: 65 IN | WEIGHT: 129.2 LBS | HEART RATE: 74 BPM | BODY MASS INDEX: 21.52 KG/M2 | OXYGEN SATURATION: 97 % | RESPIRATION RATE: 17 BRPM

## 2023-04-11 DIAGNOSIS — E03.9 ACQUIRED HYPOTHYROIDISM: ICD-10-CM

## 2023-04-11 DIAGNOSIS — Z00.00 ROUTINE GENERAL MEDICAL EXAMINATION AT A HEALTH CARE FACILITY: Primary | ICD-10-CM

## 2023-04-11 DIAGNOSIS — E78.2 MIXED HYPERLIPIDEMIA: ICD-10-CM

## 2023-04-11 DIAGNOSIS — Z12.31 SCREENING MAMMOGRAM FOR BREAST CANCER: ICD-10-CM

## 2023-04-11 DIAGNOSIS — I10 ESSENTIAL HYPERTENSION: ICD-10-CM

## 2023-04-11 DIAGNOSIS — Z12.11 SCREEN FOR COLON CANCER: ICD-10-CM

## 2023-04-11 PROCEDURE — 99396 PREV VISIT EST AGE 40-64: CPT | Performed by: NURSE PRACTITIONER

## 2023-04-11 NOTE — PROGRESS NOTES
Judith Fitzpatrick presents to [facility].    Chief Complaint:  Annual Exam         History of Present Illness:  General Health Questions  Regular exercise as least 3 days a week: no   Follows a healthy diet: tries   Wears seat belt always: yes   Drinks Alcohol: rarely   Uses Recreational drugs: none   Uses tobacco products: 1 ppd   UTD on Tetanus vaccine: no, last Tdap 2009   Does BSE: no  Last mammogram: over a year   Last colon screen: > 10 years ago     Hyperlipidemia  Current medication: crestor   Tolerating medication: Yes  Needs Refill: no    Lab Results       Component                Value               Date                       CHLPL                    179                 10/12/2022                 TRIG                     124                 10/12/2022                 HDL                      59                  10/12/2022                 LDL                      98                  10/12/2022              Hypertension:  Current medication: HCTZ   Tolerating Medication: Yes   Checking BP at home and it is: not checking   Needs refills: no   Labs:  Lab Results       Component                Value               Date                       GLUCOSE                  99                  10/12/2022                 BUN                      12                  10/12/2022                 CREATININE               0.77                10/12/2022                 EGFRIFNONA               77                  01/03/2022                 EGFRIFAFRI               89                  01/03/2022                 BCR                      16                  10/12/2022                 K                        4.4                 10/12/2022                 CO2                      25                  10/12/2022                 CALCIUM                  9.7                 10/12/2022                 PROTENTOTREF             7.2                 10/12/2022                 ALBUMIN                  4.6                  10/12/2022                 LABIL2                   1.8                 10/12/2022                 AST                      21                  10/12/2022                 ALT                      14                  10/12/2022              Hypothyroidism  Current rx: euthyrox 88   Tolerating rx: yes   Refills needed no   Lab Results       Component                Value               Date                       TSH                      4.200               2023              Past Medical History:  Changes since :        thinks she had covid   GYNECOLOGICAL HISTORY:        PREVENTIVE HEALTH MAINTENANCE         Hepatitis C Medicare Screening: was last done 17; negative      Surgical History:   Surgeries:  Biopsy of breast; benign;    Dilation and Curettage  Bilateral Tubal Ligation  BENIGN TUMOR REMOVED FROM NECK;  uterine ablation;   Procedures:  Colonoscopy (  ) heart cath normal 14      Family History:      Positive for Coronary Artery Disease and Hypertension.    Positive for Type 2 Diabetes and Hypothyroidism.    Father:  at age 70's; Cause of death was stomach/colon cancer;  Hypertension;  Type 2 Diabetes   Mother: Hypertension; Hyperlipidemia, liver disorder. Thyroid disorder, Colon cancer    sister: 4, one with pulm fibrosis, MV, thyroid disorder/colon polyps      Social History:   Occupation: Retired (Prior occupation: [employer] )   Marital Status:    Children: 2 children, 3 step-children, and (2 grandchildren living with her) and great grand child and baby's mom              Review of Systems:  Review of Systems   Constitutional: Negative for fatigue and fever.   HENT: Negative for ear pain and sore throat.    Eyes: Negative for blurred vision.   Respiratory: Negative for cough and shortness of breath.    Cardiovascular: Negative for chest pain, palpitations and leg swelling.   Gastrointestinal: Negative for abdominal pain, constipation, diarrhea,  "nausea and vomiting.   Musculoskeletal: Negative for arthralgias and myalgias.   Skin: Negative for rash.   Neurological: Negative for dizziness, weakness and headache.   Psychiatric/Behavioral: Negative for sleep disturbance and depressed mood.          Current Outpatient Medications:   •  albuterol sulfate  (90 Base) MCG/ACT inhaler, Inhale 2 puffs Every 6 (Six) Hours As Needed for Wheezing., Disp: 8 g, Rfl: 1  •  benzonatate (TESSALON) 200 MG capsule, Take one every 8 hours as needed for cough., Disp: 30 capsule, Rfl: 0  •  hydroCHLOROthiazide (HYDRODIURIL) 12.5 MG tablet, Take 1 tablet by mouth Daily., Disp: 90 tablet, Rfl: 1  •  levothyroxine (SYNTHROID, LEVOTHROID) 100 MCG tablet, Take 1 tablet by mouth once daily, Disp: 90 tablet, Rfl: 0  •  rosuvastatin (CRESTOR) 10 MG tablet, TAKE 1 TABLET BY MOUTH ONCE DAILY AT NIGHT, Disp: 90 tablet, Rfl: 1    Vital Signs:   Vitals:    04/11/23 1252   BP: 137/85   BP Location: Right arm   Patient Position: Sitting   Cuff Size: Adult   Pulse: 74   Resp: 17   SpO2: 97%  Comment: room air   Weight: 58.6 kg (129 lb 3.2 oz)   Height: 165.1 cm (65\")   PainSc: 0-No pain         Physical Exam:  Physical Exam  Vitals reviewed.   Constitutional:       General: She is not in acute distress.     Appearance: Normal appearance. She is well-developed.   HENT:      Head: Normocephalic. Hair is normal.      Right Ear: Hearing, tympanic membrane, ear canal and external ear normal. No decreased hearing noted. No drainage.      Left Ear: Hearing, tympanic membrane, ear canal and external ear normal. No decreased hearing noted.      Nose: Nose normal. No nasal deformity.      Mouth/Throat:      Mouth: Mucous membranes are moist.   Eyes:      General: Lids are normal.      Extraocular Movements: Extraocular movements intact.      Conjunctiva/sclera: Conjunctivae normal.      Pupils: Pupils are equal, round, and reactive to light.   Neck:      Thyroid: No thyromegaly.      Vascular: No " carotid bruit or JVD.   Cardiovascular:      Rate and Rhythm: Normal rate and regular rhythm.      Pulses: Normal pulses.      Heart sounds: Normal heart sounds. No murmur heard.    No friction rub. No gallop.   Pulmonary:      Effort: Pulmonary effort is normal. No respiratory distress.      Breath sounds: Normal breath sounds. No wheezing.   Chest:   Breasts:     Right: Normal. No mass, nipple discharge or skin change.      Left: Normal. No mass, nipple discharge or skin change.   Abdominal:      General: Bowel sounds are normal.      Palpations: Abdomen is soft. There is no mass.      Tenderness: There is no abdominal tenderness.   Musculoskeletal:         General: No tenderness or deformity. Normal range of motion.      Cervical back: Normal range of motion and neck supple.   Lymphadenopathy:      Cervical: No cervical adenopathy.      Upper Body:      Right upper body: No axillary adenopathy.      Left upper body: No axillary adenopathy.   Skin:     General: Skin is warm and dry.      Findings: No erythema or rash.   Neurological:      Mental Status: She is alert and oriented to person, place, and time.      Motor: No abnormal muscle tone.      Gait: Gait normal.      Deep Tendon Reflexes: Reflexes are normal and symmetric.   Psychiatric:         Mood and Affect: Mood normal.         Behavior: Behavior normal.         Thought Content: Thought content normal.         Judgment: Judgment normal.         Result Review      The following data was reviewed by: SANDRO Ram on 04/11/2023:    Results for orders placed or performed in visit on 02/02/23   TSH Rfx On Abnormal To Free T4   Result Value Ref Range    TSH 4.200 0.450 - 4.500 uIU/mL               Assessment and Plan:          Diagnoses and all orders for this visit:    1. Routine general medical examination at a health care facility (Primary)  Assessment & Plan:  Follow a healthy diet and get regular exercise.  Always wear seat belts.  Advised  monthly BSE   Check on Tdap coverage with her pharmacy  Lab orders printed she will go fasting to [facility]  Dr OFE Amaya did last colonoscopy     Orders:  -     Comprehensive Metabolic Panel; Future  -     Lipid Panel; Future    2. Screen for colon cancer  Assessment & Plan:  Send to Dr Chiu for screening CLN    Orders:  -     Cancel: Ambulatory Referral to Gastroenterology  -     Ambulatory Referral to Gastroenterology    3. Screening mammogram for breast cancer  Assessment & Plan:  Setting up mammogram     Orders:  -     Mammo Screening Digital Tomosynthesis Bilateral With CAD; Future    4. Mixed hyperlipidemia  Assessment & Plan:  Continue current medication and efforts with diet and exercise.         5. Essential hypertension  Assessment & Plan:  Continue HCTZ       6. Acquired hypothyroidism  Assessment & Plan:  TSH is stable, continue current medication and to follow up in 6 months. Make sure to take medication on an empty stomach and with no other medications.             Follow Up   Return for followup pending lab results, fasting for labs.  Patient was given instructions and counseling regarding her condition or for health maintenance advice. Please see specific information pulled into the AVS if appropriate.

## 2023-04-11 NOTE — ASSESSMENT & PLAN NOTE
TSH is stable, continue current medication and to follow up in 6 months. Make sure to take medication on an empty stomach and with no other medications.

## 2023-04-11 NOTE — ASSESSMENT & PLAN NOTE
Follow a healthy diet and get regular exercise.  Always wear seat belts.  Advised monthly BSE   Check on Tdap coverage with her pharmacy  Lab orders printed she will go fasting to [facility]  Dr OFE Amaya did last colonoscopy

## 2023-04-12 LAB
ALBUMIN SERPL-MCNC: 4.5 G/DL (ref 3.8–4.8)
ALBUMIN/GLOB SERPL: 1.9 {RATIO} (ref 1.2–2.2)
ALP SERPL-CCNC: 75 IU/L (ref 44–121)
ALT SERPL-CCNC: 11 IU/L (ref 0–32)
AST SERPL-CCNC: 16 IU/L (ref 0–40)
BILIRUB SERPL-MCNC: 0.2 MG/DL (ref 0–1.2)
BUN SERPL-MCNC: 16 MG/DL (ref 8–27)
BUN/CREAT SERPL: 19 (ref 12–28)
CALCIUM SERPL-MCNC: 9.6 MG/DL (ref 8.7–10.3)
CHLORIDE SERPL-SCNC: 102 MMOL/L (ref 96–106)
CHOLEST SERPL-MCNC: 188 MG/DL (ref 100–199)
CO2 SERPL-SCNC: 24 MMOL/L (ref 20–29)
CREAT SERPL-MCNC: 0.84 MG/DL (ref 0.57–1)
EGFRCR SERPLBLD CKD-EPI 2021: 79 ML/MIN/1.73
GLOBULIN SER CALC-MCNC: 2.4 G/DL (ref 1.5–4.5)
GLUCOSE SERPL-MCNC: 92 MG/DL (ref 70–99)
HDLC SERPL-MCNC: 67 MG/DL
LDLC SERPL CALC-MCNC: 99 MG/DL (ref 0–99)
POTASSIUM SERPL-SCNC: 4 MMOL/L (ref 3.5–5.2)
PROT SERPL-MCNC: 6.9 G/DL (ref 6–8.5)
SODIUM SERPL-SCNC: 140 MMOL/L (ref 134–144)
TRIGL SERPL-MCNC: 126 MG/DL (ref 0–149)
VLDLC SERPL CALC-MCNC: 22 MG/DL (ref 5–40)

## 2023-06-07 ENCOUNTER — PREP FOR SURGERY (OUTPATIENT)
Dept: GASTROENTEROLOGY | Facility: CLINIC | Age: 63
End: 2023-06-07
Payer: COMMERCIAL

## 2023-06-07 ENCOUNTER — PRE-PROCEDURE SCREENING (OUTPATIENT)
Dept: GASTROENTEROLOGY | Facility: CLINIC | Age: 63
End: 2023-06-07
Payer: COMMERCIAL

## 2023-06-07 DIAGNOSIS — Z12.11 ENCOUNTER FOR SCREENING FOR MALIGNANT NEOPLASM OF COLON: Primary | ICD-10-CM

## 2023-06-07 RX ORDER — SODIUM CHLORIDE, SODIUM LACTATE, POTASSIUM CHLORIDE, CALCIUM CHLORIDE 600; 310; 30; 20 MG/100ML; MG/100ML; MG/100ML; MG/100ML
30 INJECTION, SOLUTION INTRAVENOUS CONTINUOUS
OUTPATIENT
Start: 2023-06-07

## 2023-06-07 NOTE — TELEPHONE ENCOUNTER
LAST SCOPE 9/12 IN [facility]   --No personal history of polyps--Family  history  of polyps AND   colon cancer--No blood thinners--Medications:                    albuterol sulfate  (90 Base) MCG/ACT inhaler  benzonatate (TESSALON) 200 MG capsule  hydroCHLOROthiazide (HYDRODIURIL) 12.5 MG tablet  levothyroxine (SYNTHROID, LEVOTHROID) 100 MCG tablet  rosuvastatin (CRESTOR) 10 MG tablet         RECORDS IN MEDIA         QUESTIONNAIRE SCREENING  SCAN IN  MEDIA & HAS BEEN SENT TO DOCTOR FOR REVIEW

## 2023-06-10 DIAGNOSIS — E03.9 HYPOTHYROIDISM, UNSPECIFIED TYPE: ICD-10-CM

## 2023-06-12 RX ORDER — LEVOTHYROXINE SODIUM 0.1 MG/1
TABLET ORAL
Qty: 90 TABLET | Refills: 0 | Status: SHIPPED | OUTPATIENT
Start: 2023-06-12

## 2023-08-07 DIAGNOSIS — I10 ESSENTIAL HYPERTENSION: ICD-10-CM

## 2023-08-07 RX ORDER — HYDROCHLOROTHIAZIDE 12.5 MG/1
TABLET ORAL
Qty: 90 TABLET | Refills: 0 | Status: SHIPPED | OUTPATIENT
Start: 2023-08-07

## 2023-09-07 DIAGNOSIS — E03.9 HYPOTHYROIDISM, UNSPECIFIED TYPE: ICD-10-CM

## 2023-09-07 RX ORDER — LEVOTHYROXINE SODIUM 0.1 MG/1
TABLET ORAL
Qty: 90 TABLET | Refills: 0 | Status: SHIPPED | OUTPATIENT
Start: 2023-09-07

## 2023-10-09 NOTE — TELEPHONE ENCOUNTER
Pt here reports covid positive 5 days ago and needs a doctors note. Denies any complains. See her lab, I sent the thyroid rx for 90 days, but was low, recheck lab, TSH in 6 weeks

## 2023-10-12 ENCOUNTER — OFFICE VISIT (OUTPATIENT)
Dept: FAMILY MEDICINE CLINIC | Age: 63
End: 2023-10-12
Payer: COMMERCIAL

## 2023-10-12 VITALS
HEART RATE: 73 BPM | HEIGHT: 65 IN | TEMPERATURE: 97.9 F | SYSTOLIC BLOOD PRESSURE: 141 MMHG | WEIGHT: 131.8 LBS | BODY MASS INDEX: 21.96 KG/M2 | DIASTOLIC BLOOD PRESSURE: 80 MMHG

## 2023-10-12 DIAGNOSIS — M25.551 RIGHT HIP PAIN: ICD-10-CM

## 2023-10-12 DIAGNOSIS — Z23 NEED FOR INFLUENZA VACCINATION: ICD-10-CM

## 2023-10-12 DIAGNOSIS — I10 ESSENTIAL HYPERTENSION: Primary | ICD-10-CM

## 2023-10-12 DIAGNOSIS — E03.9 ACQUIRED HYPOTHYROIDISM: ICD-10-CM

## 2023-10-12 DIAGNOSIS — Z12.11 SCREEN FOR COLON CANCER: ICD-10-CM

## 2023-10-12 DIAGNOSIS — E78.2 MIXED HYPERLIPIDEMIA: ICD-10-CM

## 2023-10-12 DIAGNOSIS — Z23 IMMUNIZATION DUE: ICD-10-CM

## 2023-10-12 PROCEDURE — 99214 OFFICE O/P EST MOD 30 MIN: CPT | Performed by: NURSE PRACTITIONER

## 2023-10-12 RX ORDER — HYDROCHLOROTHIAZIDE 12.5 MG/1
12.5 TABLET ORAL DAILY
Qty: 90 TABLET | Refills: 1 | Status: SHIPPED | OUTPATIENT
Start: 2023-10-12

## 2023-10-12 NOTE — PROGRESS NOTES
Chief Complaint  Hypothyroidism (6 month follow up thyroid, HLD, HTN.)    Subjective          Judith Fitzpatrick presents to [facility]    History of Present Illness  Hyperlipidemia  Current medication: crestor   Tolerating medication: Yes  Needs Refill: No     Lab Results       Component                Value               Date                       CHLPL                    188                 04/11/2023                 TRIG                     126                 04/11/2023                 HDL                      67                  04/11/2023                 LDL                      99                  04/11/2023                Hypothyroidism  Current rx: euthyrox 88   Tolerating rx: yes, at night before bed   Refills needed no not yet   Lab Results       Component                Value               Date                       TSH                      4.200               02/02/2023              Hypertension:  Current medication: HCTZ  Tolerating Medication: Yes  Checking BP at home and it is: not checking   Needs refills: Yes to [facility]   Labs:  Lab Results       Component                Value               Date                       GLUCOSE                  92                  04/11/2023                 BUN                      16                  04/11/2023                 CREATININE               0.84                04/11/2023                 EGFRIFNONA               77                  01/03/2022                 EGFRIFAFRI               89                  01/03/2022                 BCR                      19                  04/11/2023                 K                        4.0                 04/11/2023                 CO2                      24                  04/11/2023                 CALCIUM                  9.6                 04/11/2023                 PROTENTOTREF             6.9                 04/11/2023                 ALBUMIN                  4.5                  2023                 LABIL2                   1.9                 2023                 AST                      16                  2023                 ALT                      11                  2023              Past Medical History:  Changes since 2023:        thinks she had covid   GYNECOLOGICAL HISTORY:        PREVENTIVE HEALTH MAINTENANCE         Hepatitis C Medicare Screening: was last done 17; negative      Surgical History:   Surgeries:  Biopsy of breast; benign;    Dilation and Curettage  Bilateral Tubal Ligation  BENIGN TUMOR REMOVED FROM NECK;  uterine ablation;   Procedures:  Colonoscopy (  ) heart cath normal 14      Family History:      Positive for Coronary Artery Disease and Hypertension.    Positive for Type 2 Diabetes and Hypothyroidism.    Father:  at age 70's; Cause of death was stomach/colon cancer;  Hypertension;  Type 2 Diabetes   Mother: Hypertension; Hyperlipidemia, liver disorder. Thyroid disorder, Colon cancer    sister: 4, one with pulm fibrosis, MV, thyroid disorder/colon polyps      Social History:   Occupation: Retired (Prior occupation: [employer] )   Marital Status:    Children: 2 children, 3 step-children, and (2 grandchildren living with her) and great grand child and baby's mom                        Past Medical History:   Diagnosis Date    Chronic cough     Hashimoto's disease     Hearing loss     Hypertension     Hypothyroidism     estimated time frame, pt notes was well after she had her children    Insomnia     Mixed hyperlipidemia     Nicotine dependence     Nodular goiter     noted in outside records but have no documented images showing what is present    Swallowing difficulty     estimated time frame states was main reason had initial thyroid US in  but this problem was never addressed or evaluated itself    Tinnitus, left ear        Allergies   Allergen Reactions     Atorvastatin Unknown - High Severity        Past Surgical History:   Procedure Laterality Date    BREAST BIOPSY  2004    benign     CARDIAC CATHETERIZATION      COLONOSCOPY  2012    D & C AND LAPAROSCOPY      DILATION AND CURETTAGE, DIAGNOSTIC / THERAPEUTIC      ENDOMETRIAL ABLATION      EXCISION TUMOR NECK / THORAX  1975    reports was a cyst that was drianed, likely a congenital item    TUBAL ABDOMINAL LIGATION          Social History     Tobacco Use    Smoking status: Every Day     Packs/day: 1.00     Years: 40.00     Additional pack years: 0.00     Total pack years: 40.00     Types: Cigarettes     Start date: 1983     Passive exposure: Past    Smokeless tobacco: Never   Substance Use Topics    Alcohol use: Not Currently       Family History   Problem Relation Age of Onset    Hypertension Mother     Hyperlipidemia Mother     Stomach cancer Father     Hypertension Father     Diabetes type II Father     Hypothyroidism Other     Coronary artery disease Other     Hypertension Other         Health Maintenance Due   Topic Date Due    Pneumococcal Vaccine 0-64 (1 - PCV) Never done    ZOSTER VACCINE (1 of 2) Never done    MAMMOGRAM  09/24/2011    TDAP/TD VACCINES (2 - Td or Tdap) 05/13/2019    COLORECTAL CANCER SCREENING  09/28/2022    LUNG CANCER SCREENING  06/16/2023    INFLUENZA VACCINE  08/01/2023    COVID-19 Vaccine (5 - 2023-24 season) 09/01/2023        Current Outpatient Medications on File Prior to Visit   Medication Sig    albuterol sulfate  (90 Base) MCG/ACT inhaler Inhale 2 puffs Every 6 (Six) Hours As Needed for Wheezing.    benzonatate (TESSALON) 200 MG capsule Take one every 8 hours as needed for cough.    levothyroxine (SYNTHROID, LEVOTHROID) 100 MCG tablet Take 1 tablet by mouth once daily    rosuvastatin (CRESTOR) 10 MG tablet TAKE 1 TABLET BY MOUTH ONCE DAILY AT NIGHT    [DISCONTINUED] hydroCHLOROthiazide (HYDRODIURIL) 12.5 MG tablet Take 1 tablet by mouth once daily     No current  "facility-administered medications on file prior to visit.       Immunization History   Administered Date(s) Administered    COVID-19 (MODERNA) 1st,2nd,3rd Dose Monovalent 03/23/2021, 04/20/2021    COVID-19 (PFIZER) BIVALENT 12+YRS 10/11/2022    COVID-19 (PFIZER) Purple Cap Monovalent 01/03/2022    Flu Vaccine Intradermal Quad 18-64YR 01/04/2021    Fluzone (or Fluarix & Flulaval for VFC) >6mos 01/03/2022, 10/11/2022    Tdap 05/13/2009       Review of Systems   Constitutional:  Negative for fatigue and fever.   Respiratory:  Positive for cough (a few weeks ago, feels like allergy related). Negative for shortness of breath.    Cardiovascular:  Negative for chest pain, palpitations and leg swelling.   Musculoskeletal:  Positive for arthralgias (right thigh and hip, no injury,  2 weeks) and back pain (a few weeks ago, not now).   Neurological:  Negative for numbness.        Objective     Vitals:    10/12/23 0905   BP: 141/80   BP Location: Left arm   Patient Position: Sitting   Cuff Size: Large Adult   Pulse: 73   Temp: 97.9 øF (36.6 øC)   TempSrc: Oral   Weight: 59.8 kg (131 lb 12.8 oz)   Height: 165.1 cm (65\")            Physical Exam  Vitals reviewed.   Constitutional:       General: She is not in acute distress.     Appearance: Normal appearance.   Neck:      Vascular: No carotid bruit.   Cardiovascular:      Rate and Rhythm: Normal rate and regular rhythm.      Heart sounds: Normal heart sounds. No murmur heard.  Pulmonary:      Effort: Pulmonary effort is normal. No respiratory distress.      Breath sounds: Normal breath sounds.   Musculoskeletal:         General: No tenderness (to palpation of hip, full ROM of hip without pain).      Right lower leg: No edema.      Left lower leg: No edema.   Neurological:      Mental Status: She is alert.   Psychiatric:         Mood and Affect: Mood normal.         Behavior: Behavior normal.         Result Review :     The following data was reviewed by: Linette Tracey, " SANDRO on 10/12/2023:                       Assessment and Plan      Diagnoses and all orders for this visit:    1. Essential hypertension (Primary)  Assessment & Plan:  Hypertension is stable. to monitor BP at home. Continue current med. Continue to modify diet and lifestyle.      Orders:  -     Cancel: Comprehensive Metabolic Panel  -     Cancel: Lipid Panel  -     Comprehensive Metabolic Panel; Future  -     Lipid Panel; Future  -     hydroCHLOROthiazide (HYDRODIURIL) 12.5 MG tablet; Take 1 tablet by mouth Daily.  Dispense: 90 tablet; Refill: 1    2. Acquired hypothyroidism  Assessment & Plan:  Pending lab results, if TSH is stable, continue current medication and to follow up in 6 months. Make sure to take medication on an empty stomach and with no other medications.       Orders:  -     Cancel: TSH Rfx On Abnormal To Free T4  -     TSH Rfx On Abnormal To Free T4; Future    3. Mixed hyperlipidemia  Assessment & Plan:  Had coffee with cream and sugar this am; Continue current medication and efforts with diet and exercise.   [facility]       Orders:  -     Cancel: Comprehensive Metabolic Panel  -     Cancel: Lipid Panel  -     Comprehensive Metabolic Panel; Future  -     Lipid Panel; Future    4. Immunization due  Assessment & Plan:  Will update with covid and flu vaccines       5. Right hip pain  Assessment & Plan:  Try OTC tylenol, rest, ice/heat, if symptoms persist, follow up       6. Screen for colon cancer  Assessment & Plan:  Her colon screen and CT chest screen for lung cancer, never got scheduled, having referrals check on these          BMI is within normal parameters. No other follow-up for BMI required.         Follow Up     Return if symptoms worsen or fail to improve, for followup pending lab results.    Patient was given instructions and counseling regarding her condition or for health maintenance advice. Please see specific information pulled into the AVS if  appropriate.

## 2023-10-12 NOTE — ASSESSMENT & PLAN NOTE
Had coffee with cream and sugar this am; Continue current medication and efforts with diet and exercise.   She is going to [facility] today for her labs     
Her colon screen and CT chest screen for lung cancer, never got scheduled, having referrals check on these  
Hypertension is stable. to monitor BP at home. Continue current med. Continue to modify diet and lifestyle.    
Pending lab results, if TSH is stable, continue current medication and to follow up in 6 months. Make sure to take medication on an empty stomach and with no other medications.     
Try OTC tylenol, rest, ice/heat, if symptoms persist, follow up   
Will update with covid and flu vaccines   
warm

## 2023-10-12 NOTE — Clinical Note
She said she never heard back from referrals on her CT chest and colon screen, she wants in [facility], she never heard back, she says if they call and leave a message she will call back

## 2023-10-13 DIAGNOSIS — E78.2 MIXED HYPERLIPIDEMIA: ICD-10-CM

## 2023-10-13 LAB
ALBUMIN SERPL-MCNC: 4.8 G/DL (ref 3.9–4.9)
ALBUMIN/GLOB SERPL: 1.7 {RATIO} (ref 1.2–2.2)
ALP SERPL-CCNC: 88 IU/L (ref 44–121)
ALT SERPL-CCNC: 22 IU/L (ref 0–32)
AMBIG ABBREV CMP14 DEFAULT: NORMAL
AMBIG ABBREV LP DEFAULT: NORMAL
AST SERPL-CCNC: 22 IU/L (ref 0–40)
BILIRUB SERPL-MCNC: 0.3 MG/DL (ref 0–1.2)
BUN SERPL-MCNC: 14 MG/DL (ref 8–27)
BUN/CREAT SERPL: 16 (ref 12–28)
CALCIUM SERPL-MCNC: 10.1 MG/DL (ref 8.7–10.3)
CHLORIDE SERPL-SCNC: 100 MMOL/L (ref 96–106)
CHOLEST SERPL-MCNC: 210 MG/DL (ref 100–199)
CO2 SERPL-SCNC: 24 MMOL/L (ref 20–29)
CREAT SERPL-MCNC: 0.88 MG/DL (ref 0.57–1)
EGFRCR SERPLBLD CKD-EPI 2021: 74 ML/MIN/1.73
GLOBULIN SER CALC-MCNC: 2.8 G/DL (ref 1.5–4.5)
GLUCOSE SERPL-MCNC: 103 MG/DL (ref 70–99)
HDLC SERPL-MCNC: 72 MG/DL
LDLC SERPL CALC-MCNC: 115 MG/DL (ref 0–99)
POTASSIUM SERPL-SCNC: 4.5 MMOL/L (ref 3.5–5.2)
PROT SERPL-MCNC: 7.6 G/DL (ref 6–8.5)
SODIUM SERPL-SCNC: 140 MMOL/L (ref 134–144)
TRIGL SERPL-MCNC: 131 MG/DL (ref 0–149)
TSH SERPL DL<=0.005 MIU/L-ACNC: 2.31 UIU/ML (ref 0.45–4.5)
VLDLC SERPL CALC-MCNC: 23 MG/DL (ref 5–40)

## 2023-10-13 RX ORDER — ROSUVASTATIN CALCIUM 10 MG/1
TABLET, COATED ORAL
Qty: 90 TABLET | Refills: 0 | Status: SHIPPED | OUTPATIENT
Start: 2023-10-13

## 2023-12-09 DIAGNOSIS — E03.9 HYPOTHYROIDISM, UNSPECIFIED TYPE: ICD-10-CM

## 2023-12-11 RX ORDER — LEVOTHYROXINE SODIUM 0.1 MG/1
TABLET ORAL
Qty: 90 TABLET | Refills: 1 | Status: SHIPPED | OUTPATIENT
Start: 2023-12-11

## 2023-12-20 ENCOUNTER — HOSPITAL ENCOUNTER (OUTPATIENT)
Dept: CT IMAGING | Facility: HOSPITAL | Age: 63
Discharge: HOME | End: 2023-12-20
Admitting: NURSE PRACTITIONER
Payer: COMMERCIAL

## 2023-12-20 DIAGNOSIS — Z12.2 SCREENING FOR LUNG CANCER: ICD-10-CM

## 2023-12-20 PROCEDURE — 71271 CT THORAX LUNG CANCER SCR C-: CPT

## 2024-01-03 DIAGNOSIS — R91.8 PULMONARY NODULES: Primary | ICD-10-CM

## 2024-01-22 DIAGNOSIS — E78.2 MIXED HYPERLIPIDEMIA: ICD-10-CM

## 2024-01-22 RX ORDER — ROSUVASTATIN CALCIUM 10 MG/1
TABLET, COATED ORAL
Qty: 90 TABLET | Refills: 0 | Status: SHIPPED | OUTPATIENT
Start: 2024-01-22

## 2024-04-11 ENCOUNTER — OFFICE VISIT (OUTPATIENT)
Dept: FAMILY MEDICINE CLINIC | Age: 64
End: 2024-04-11
Payer: COMMERCIAL

## 2024-04-11 VITALS
HEART RATE: 71 BPM | DIASTOLIC BLOOD PRESSURE: 91 MMHG | HEIGHT: 65 IN | SYSTOLIC BLOOD PRESSURE: 141 MMHG | BODY MASS INDEX: 22.53 KG/M2 | TEMPERATURE: 97.9 F | WEIGHT: 135.2 LBS

## 2024-04-11 DIAGNOSIS — Z12.11 SCREEN FOR COLON CANCER: ICD-10-CM

## 2024-04-11 DIAGNOSIS — I10 ESSENTIAL HYPERTENSION: ICD-10-CM

## 2024-04-11 DIAGNOSIS — Z12.31 SCREENING MAMMOGRAM FOR BREAST CANCER: ICD-10-CM

## 2024-04-11 DIAGNOSIS — E78.2 MIXED HYPERLIPIDEMIA: ICD-10-CM

## 2024-04-11 DIAGNOSIS — R93.89 ABNORMAL CT OF THE CHEST: ICD-10-CM

## 2024-04-11 DIAGNOSIS — E03.9 ACQUIRED HYPOTHYROIDISM: Primary | ICD-10-CM

## 2024-04-11 DIAGNOSIS — Z00.00 ROUTINE GENERAL MEDICAL EXAMINATION AT A HEALTH CARE FACILITY: ICD-10-CM

## 2024-04-11 NOTE — ASSESSMENT & PLAN NOTE
Advise regular exercise, healthy eating, always wear seat belts.  Immunizations discussed. To check with her pharmacy on shingrex, Tdap, RSV and covid vaccines    Reviewed last pap, declines repeat pap at this time.

## 2024-04-11 NOTE — PROGRESS NOTES
Chief Complaint  Annual Exam and follow up     Subjective          Judith Fitzpatrick presents to [facility]    History of Present Illness  General Health Questions  Regular exercise as least 3 days a week: no   Follows a healthy diet: yes   Wears seat belt always: yes   Drinks Alcohol: rarely   Uses Recreational drugs: No   Uses tobacco products: cutting back, <1ppd   UTD on Tetanus vaccine: 2009  Does BSE:no, declines mammogram today   Last mammogram:scheduled one last year, did not get   Last pap : negative 1-2021, declines repeat today   Last colon screen: 9-    Hyperlipidemia  Current medication: crestor   Tolerating medication: Yes  Needs Refill:  no    Lab Results       Component                Value               Date                       CHLPL                    210 (H)             10/12/2023                 TRIG                     131                 10/12/2023                 HDL                      72                  10/12/2023                 LDL                      115 (H)             10/12/2023              Hypothyroidism  Current rx: euthyrox 88  Tolerating rx: yes takes at [facility]   Refills needed no   Lab Results       Component                Value               Date                       TSH                      2.310               10/12/2023              Hypertension:  Current medication: HCTZ  Tolerating Medication: Yes  Checking BP at home and it is: not checking   Needs refills: no   Labs:  Lab Results       Component                Value               Date                       GLUCOSE                  103 (H)             10/12/2023                 BUN                      14                  10/12/2023                 CREATININE               0.88                10/12/2023                 EGFRIFNONA               77                  01/03/2022                 EGFRIFAFRI               89                  01/03/2022                 BCR                       16                  10/12/2023                 K                        4.5                 10/12/2023                 CO2                      24                  10/12/2023                 CALCIUM                  10.1                10/12/2023                 PROTENTOTREF             7.6                 10/12/2023                 ALBUMIN                  4.8                 10/12/2023                 LABIL2                   1.7                 10/12/2023                 AST                      22                  10/12/2023                 ALT                      22                  10/12/2023              Past Medical History:  Changes since 10-:        thinks she had covid   GYNECOLOGICAL HISTORY:        PREVENTIVE HEALTH MAINTENANCE         Hepatitis C Medicare Screening: was last done 17; negative      Surgical History:     Biopsy of breast; benign;    Dilation and Curettage  Bilateral Tubal Ligation  BENIGN TUMOR REMOVED FROM NECK;  uterine ablation;   Procedures:  Colonoscopy (  )   heart cath normal 14      Family History:      Positive for Coronary Artery Disease and Hypertension.    Positive for Type 2 Diabetes and Hypothyroidism.    Father:  at age 70's; Cause of death was stomach/colon cancer;  Hypertension;  Type 2 Diabetes   Mother: Hypertension; Hyperlipidemia, liver disorder. Thyroid disorder, Colon cancer    sister: 4, one with pulm fibrosis, MV, thyroid disorder/colon polyps      Social History:   Occupation: Retired (Prior occupation: [employer] )   Marital Status:    Children: 2 children, 3 step-children, and (2 grandchildren living with her) and great grand child and baby's mom                      Past Medical History:   Diagnosis Date    Chronic cough     Hashimoto's disease     Hearing loss     Hypertension     Hypothyroidism     estimated time frame, pt notes was well after she had her children    Insomnia     Mixed  hyperlipidemia     Nicotine dependence 1979    Nodular goiter 2014    noted in outside records but have no documented images showing what is present    Swallowing difficulty 2012    estimated time frame states was main reason had initial thyroid US in '14 but this problem was never addressed or evaluated itself    Tinnitus, left ear        Allergies   Allergen Reactions    Atorvastatin Unknown - High Severity        Past Surgical History:   Procedure Laterality Date    BREAST BIOPSY  2004    benign     CARDIAC CATHETERIZATION      COLONOSCOPY  2012    D & C AND LAPAROSCOPY      DILATION AND CURETTAGE, DIAGNOSTIC / THERAPEUTIC      ENDOMETRIAL ABLATION      EXCISION TUMOR NECK / THORAX  1975    reports was a cyst that was drianed, likely a congenital item    TUBAL ABDOMINAL LIGATION          Social History     Tobacco Use    Smoking status: Every Day     Current packs/day: 1.00     Average packs/day: 1 pack/day for 41.3 years (41.3 ttl pk-yrs)     Types: Cigarettes     Start date: 1983     Passive exposure: Past    Smokeless tobacco: Never   Substance Use Topics    Alcohol use: Not Currently       Family History   Problem Relation Age of Onset    Hypertension Mother     Hyperlipidemia Mother     Stomach cancer Father     Hypertension Father     Diabetes type II Father     Hypothyroidism Other     Coronary artery disease Other     Hypertension Other         Health Maintenance Due   Topic Date Due    Pneumococcal Vaccine 0-64 (1 of 2 - PCV) Never done    ZOSTER VACCINE (1 of 2) Never done    MAMMOGRAM  09/24/2011    TDAP/TD VACCINES (2 - Td or Tdap) 05/13/2019    COLORECTAL CANCER SCREENING  09/28/2022    COVID-19 Vaccine (5 - 2023-24 season) 09/01/2023    PAP SMEAR  01/12/2024        Current Outpatient Medications on File Prior to Visit   Medication Sig    albuterol sulfate  (90 Base) MCG/ACT inhaler Inhale 2 puffs Every 6 (Six) Hours As Needed for Wheezing.    benzonatate (TESSALON) 200 MG capsule Take one  "every 8 hours as needed for cough.    hydroCHLOROthiazide (HYDRODIURIL) 12.5 MG tablet Take 1 tablet by mouth Daily.    levothyroxine (SYNTHROID, LEVOTHROID) 100 MCG tablet Take 1 tablet by mouth once daily    rosuvastatin (CRESTOR) 10 MG tablet TAKE 1 TABLET BY MOUTH ONCE DAILY AT NIGHT     No current facility-administered medications on file prior to visit.       Immunization History   Administered Date(s) Administered    COVID-19 (MODERNA) 1st,2nd,3rd Dose Monovalent 03/23/2021, 04/20/2021    COVID-19 (PFIZER) BIVALENT 12+YRS 10/11/2022    COVID-19 (PFIZER) Purple Cap Monovalent 01/03/2022    Flu Vaccine Intradermal Quad 18-64YR 01/04/2021    Fluzone (or Fluarix & Flulaval for VFC) >6mos 01/03/2022, 10/11/2022, 10/12/2023    Tdap 05/13/2009       Review of Systems   Constitutional:  Negative for fatigue and fever.   HENT:  Negative for ear pain and sore throat.    Eyes:  Negative for blurred vision.   Respiratory:  Negative for cough and shortness of breath.    Cardiovascular:  Negative for chest pain, palpitations and leg swelling.   Gastrointestinal:  Negative for abdominal pain, constipation, diarrhea, nausea and vomiting.   Musculoskeletal:  Negative for arthralgias and myalgias.   Skin:  Negative for rash.   Neurological:  Negative for dizziness, weakness and headache.   Psychiatric/Behavioral:  Negative for sleep disturbance and depressed mood.         Objective     Vitals:    04/11/24 1039   BP: 141/91   BP Location: Right arm   Patient Position: Sitting   Cuff Size: Adult   Pulse: 71   Temp: 97.9 °F (36.6 °C)   TempSrc: Oral   Weight: 61.3 kg (135 lb 3.2 oz)   Height: 165.1 cm (65\")            Physical Exam  Vitals reviewed.   Constitutional:       General: She is not in acute distress.     Appearance: Normal appearance. She is well-developed.   HENT:      Head: Normocephalic. Hair is normal.      Right Ear: Hearing, tympanic membrane, ear canal and external ear normal. No decreased hearing noted. No " drainage.      Left Ear: Hearing, tympanic membrane, ear canal and external ear normal. No decreased hearing noted.      Nose: Nose normal. No nasal deformity.      Mouth/Throat:      Mouth: Mucous membranes are moist.   Eyes:      General: Lids are normal.      Extraocular Movements: Extraocular movements intact.      Conjunctiva/sclera: Conjunctivae normal.      Pupils: Pupils are equal, round, and reactive to light.   Neck:      Thyroid: No thyromegaly.      Vascular: No carotid bruit or JVD.   Cardiovascular:      Rate and Rhythm: Normal rate and regular rhythm.      Pulses: Normal pulses.      Heart sounds: Normal heart sounds. No murmur heard.     No friction rub. No gallop.   Pulmonary:      Effort: Pulmonary effort is normal. No respiratory distress.      Breath sounds: Normal breath sounds. No wheezing.   Chest:   Breasts:     Right: Normal. No mass, nipple discharge or skin change.      Left: Normal. No mass, nipple discharge or skin change.   Abdominal:      General: Bowel sounds are normal.      Palpations: Abdomen is soft. There is no mass.      Tenderness: There is no abdominal tenderness.   Musculoskeletal:         General: No tenderness or deformity. Normal range of motion.      Cervical back: Normal range of motion and neck supple.   Lymphadenopathy:      Cervical: No cervical adenopathy.      Upper Body:      Right upper body: No axillary adenopathy.      Left upper body: No axillary adenopathy.   Skin:     General: Skin is warm and dry.      Findings: No erythema or rash.   Neurological:      Mental Status: She is alert and oriented to person, place, and time.      Motor: No abnormal muscle tone.      Gait: Gait normal.      Deep Tendon Reflexes: Reflexes are normal and symmetric.   Psychiatric:         Mood and Affect: Mood normal.         Behavior: Behavior normal.         Thought Content: Thought content normal.         Judgment: Judgment normal.         Result Review :     The following data  was reviewed by: SANDRO Ram on 04/11/2024:                       Assessment and Plan      Diagnoses and all orders for this visit:    1. Acquired hypothyroidism (Primary)  Assessment & Plan:  She gets labs at [facility], order given for follow up labs; Pending lab results, if TSH is stable, continue current medication and to follow up in 6 months. Make sure to take medication on an empty stomach and with no other medications.       Orders:  -     Cancel: TSH Rfx On Abnormal To Free T4  -     TSH Rfx On Abnormal To Free T4; Future    2. Essential hypertension  Assessment & Plan:  Hypertension is stable. advised to monitor BP at home. Continue current meds. Continue to modify diet and lifestyle.        Orders:  -     Cancel: Comprehensive metabolic panel; Future  -     Comprehensive metabolic panel; Future    3. Mixed hyperlipidemia  Assessment & Plan:  To go to [facility] for her fasting labs, order given/ Continue current medication and efforts with diet and exercise.       Orders:  -     Cancel: Comprehensive metabolic panel; Future  -     Cancel: Lipid panel; Future  -     Comprehensive metabolic panel; Future  -     Lipid panel; Future    4. Routine general medical examination at a health care facility  Assessment & Plan:  Advise regular exercise, healthy eating, always wear seat belts.  Immunizations discussed. To check with her pharmacy on shingrex, Tdap, RSV and covid vaccines    Reviewed last pap, declines repeat pap at this time.     Orders:  -     Cancel: TSH Rfx On Abnormal To Free T4  -     Cancel: Comprehensive metabolic panel; Future  -     Cancel: Lipid panel; Future  -     Cancel: CBC w AUTO Differential; Future  -     CBC w AUTO Differential; Future  -     Comprehensive metabolic panel; Future  -     Lipid panel; Future  -     TSH Rfx On Abnormal To Free T4; Future    5. Screen for colon cancer  Assessment & Plan:  Wants to go to [place] for her screening for Colon cancer      Orders:  -     Ambulatory Referral to Gastroenterology    6. Screening mammogram for breast cancer  Assessment & Plan:  continue BSE, declines mammogram today       7. Abnormal CT of the chest  Assessment & Plan:  Advised to quit smoking, reviewed CT of chest, schedule a follow up low dose CT, she was advised to see pulm in , but she did not     Orders:  -     CT Chest Low Dose Wo; Future        BMI is within normal parameters. No other follow-up for BMI required.           Follow Up     Return for followup pending lab results CT chest results .    Patient was given instructions and counseling regarding her condition or for health maintenance advice. Please see specific information pulled into the AVS if appropriate.

## 2024-04-11 NOTE — ASSESSMENT & PLAN NOTE
Advised to quit smoking, reviewed CT of chest, schedule a follow up low dose CT, she was advised to see pulm in , but she did not

## 2024-04-11 NOTE — ASSESSMENT & PLAN NOTE
[facility] for [facility] fasting labs, order given/ Continue current medication and efforts with diet and exercise.

## 2024-04-11 NOTE — ASSESSMENT & PLAN NOTE
She gets labs at [facility], order given for follow up labs; Pending lab results, if TSH is stable, continue current medication and to follow up in 6 months. Make sure to take medication on an empty stomach and with no other medications.

## 2024-04-11 NOTE — ASSESSMENT & PLAN NOTE
Hypertension is stable. advised to monitor BP at home. Continue current meds. Continue to modify diet and lifestyle.

## 2024-04-15 DIAGNOSIS — R93.89 ABNORMAL CT OF THE CHEST: Primary | ICD-10-CM

## 2024-04-18 DIAGNOSIS — E78.2 MIXED HYPERLIPIDEMIA: ICD-10-CM

## 2024-04-18 RX ORDER — ROSUVASTATIN CALCIUM 10 MG/1
TABLET, COATED ORAL
Qty: 90 TABLET | Refills: 0 | Status: SHIPPED | OUTPATIENT
Start: 2024-04-18

## 2024-05-16 ENCOUNTER — TELEPHONE (OUTPATIENT)
Dept: FAMILY MEDICINE CLINIC | Age: 64
End: 2024-05-16
Payer: COMMERCIAL

## 2024-05-24 ENCOUNTER — HOSPITAL ENCOUNTER (OUTPATIENT)
Dept: CT IMAGING | Facility: HOSPITAL | Age: 64
Discharge: HOME | End: 2024-05-24
Admitting: NURSE PRACTITIONER
Payer: COMMERCIAL

## 2024-05-24 DIAGNOSIS — R93.89 ABNORMAL CT OF THE CHEST: ICD-10-CM

## 2024-05-24 PROCEDURE — 71250 CT THORAX DX C-: CPT

## 2024-05-27 DIAGNOSIS — E03.9 HYPOTHYROIDISM, UNSPECIFIED TYPE: ICD-10-CM

## 2024-05-28 RX ORDER — LEVOTHYROXINE SODIUM 0.1 MG/1
100 TABLET ORAL DAILY
Qty: 30 TABLET | Refills: 0 | Status: SHIPPED | OUTPATIENT
Start: 2024-05-28

## 2024-05-29 ENCOUNTER — TELEPHONE (OUTPATIENT)
Dept: FAMILY MEDICINE CLINIC | Age: 64
End: 2024-05-29
Payer: COMMERCIAL

## 2024-05-29 NOTE — TELEPHONE ENCOUNTER
Lab orders from 04/11/24 changed to external lab and faxed.  Left pt a message on her vm letting her know if faxed them.

## 2024-05-29 NOTE — TELEPHONE ENCOUNTER
Caller: Judith Fitzpatrick    Relationship: Self    Best call back number: [phone]     What form or medical record are you requesting: CURRENT LAB ORDERS    Who is requesting this form or medical record from you: PATIENT    How would you like to receive the form or medical records (pick-up, mail, fax): FAX TO [facility] ON [address] [facility] IN [place]  PHONE NUMBER [phone]    Timeframe paperwork needed: 5.30.2024    Additional notes: PLEASE CONTACT PATIENT ONCE ORDERS HAVE BEEN FAXED.         MYCHART YES OR CALL MAY LEAVE VOICEMAIL

## 2024-06-08 LAB
ALBUMIN SERPL-MCNC: 4.4 G/DL (ref 3.9–4.9)
ALBUMIN/GLOB SERPL: 2.1 {RATIO} (ref 1.2–2.2)
ALP SERPL-CCNC: 77 IU/L (ref 44–121)
ALT SERPL-CCNC: 18 IU/L (ref 0–32)
AMBIG ABBREV CMP14 DEFAULT: NORMAL
AMBIG ABBREV LP DEFAULT: NORMAL
AST SERPL-CCNC: 15 IU/L (ref 0–40)
BASOPHILS # BLD AUTO: 0 X10E3/UL (ref 0–0.2)
BASOPHILS NFR BLD AUTO: 1 %
BILIRUB SERPL-MCNC: 0.3 MG/DL (ref 0–1.2)
BUN SERPL-MCNC: 13 MG/DL (ref 8–27)
BUN/CREAT SERPL: 15 (ref 12–28)
CALCIUM SERPL-MCNC: 9.8 MG/DL (ref 8.7–10.3)
CHLORIDE SERPL-SCNC: 101 MMOL/L (ref 96–106)
CHOLEST SERPL-MCNC: 215 MG/DL (ref 100–199)
CO2 SERPL-SCNC: 24 MMOL/L (ref 20–29)
CREAT SERPL-MCNC: 0.84 MG/DL (ref 0.57–1)
EGFRCR SERPLBLD CKD-EPI 2021: 78 ML/MIN/1.73
EOSINOPHIL # BLD AUTO: 0.2 X10E3/UL (ref 0–0.4)
EOSINOPHIL NFR BLD AUTO: 3 %
ERYTHROCYTE [DISTWIDTH] IN BLOOD BY AUTOMATED COUNT: 12.5 % (ref 11.7–15.4)
GLOBULIN SER CALC-MCNC: 2.1 G/DL (ref 1.5–4.5)
GLUCOSE SERPL-MCNC: 115 MG/DL (ref 70–99)
HCT VFR BLD AUTO: 43.3 % (ref 34–46.6)
HDLC SERPL-MCNC: 73 MG/DL
HGB BLD-MCNC: 14.3 G/DL (ref 11.1–15.9)
IMM GRANULOCYTES # BLD AUTO: 0 X10E3/UL (ref 0–0.1)
IMM GRANULOCYTES NFR BLD AUTO: 0 %
LDLC SERPL CALC-MCNC: 125 MG/DL (ref 0–99)
LYMPHOCYTES # BLD AUTO: 1.9 X10E3/UL (ref 0.7–3.1)
LYMPHOCYTES NFR BLD AUTO: 29 %
MCH RBC QN AUTO: 28.5 PG (ref 26.6–33)
MCHC RBC AUTO-ENTMCNC: 33 G/DL (ref 31.5–35.7)
MCV RBC AUTO: 86 FL (ref 79–97)
MONOCYTES # BLD AUTO: 0.8 X10E3/UL (ref 0.1–0.9)
MONOCYTES NFR BLD AUTO: 12 %
NEUTROPHILS # BLD AUTO: 3.4 X10E3/UL (ref 1.4–7)
NEUTROPHILS NFR BLD AUTO: 55 %
PLATELET # BLD AUTO: 295 X10E3/UL (ref 150–450)
POTASSIUM SERPL-SCNC: 4.2 MMOL/L (ref 3.5–5.2)
PROT SERPL-MCNC: 6.5 G/DL (ref 6–8.5)
RBC # BLD AUTO: 5.02 X10E6/UL (ref 3.77–5.28)
SODIUM SERPL-SCNC: 139 MMOL/L (ref 134–144)
TRIGL SERPL-MCNC: 97 MG/DL (ref 0–149)
TSH SERPL DL<=0.005 MIU/L-ACNC: 1.15 UIU/ML (ref 0.45–4.5)
VLDLC SERPL CALC-MCNC: 17 MG/DL (ref 5–40)
WBC # BLD AUTO: 6.3 X10E3/UL (ref 3.4–10.8)

## 2024-06-23 DIAGNOSIS — E03.9 HYPOTHYROIDISM, UNSPECIFIED TYPE: ICD-10-CM

## 2024-06-24 RX ORDER — LEVOTHYROXINE SODIUM 0.1 MG/1
100 TABLET ORAL DAILY
Qty: 90 TABLET | Refills: 0 | Status: SHIPPED | OUTPATIENT
Start: 2024-06-24

## 2024-07-22 DIAGNOSIS — E78.2 MIXED HYPERLIPIDEMIA: ICD-10-CM

## 2024-07-22 RX ORDER — ROSUVASTATIN CALCIUM 10 MG/1
TABLET, COATED ORAL
Qty: 90 TABLET | Refills: 0 | Status: SHIPPED | OUTPATIENT
Start: 2024-07-22

## 2024-07-30 DIAGNOSIS — I10 ESSENTIAL HYPERTENSION: ICD-10-CM

## 2024-07-30 RX ORDER — HYDROCHLOROTHIAZIDE 12.5 MG/1
12.5 TABLET ORAL DAILY
Qty: 90 TABLET | Refills: 0 | Status: SHIPPED | OUTPATIENT
Start: 2024-07-30

## 2024-09-17 DIAGNOSIS — E03.9 HYPOTHYROIDISM, UNSPECIFIED TYPE: ICD-10-CM

## 2024-09-17 RX ORDER — LEVOTHYROXINE SODIUM 100 UG/1
100 TABLET ORAL DAILY
Qty: 90 TABLET | Refills: 0 | Status: SHIPPED | OUTPATIENT
Start: 2024-09-17

## 2024-10-14 ENCOUNTER — OFFICE VISIT (OUTPATIENT)
Dept: FAMILY MEDICINE CLINIC | Age: 64
End: 2024-10-14
Payer: COMMERCIAL

## 2024-10-14 VITALS
BODY MASS INDEX: 23.49 KG/M2 | DIASTOLIC BLOOD PRESSURE: 102 MMHG | HEART RATE: 76 BPM | TEMPERATURE: 98.2 F | SYSTOLIC BLOOD PRESSURE: 167 MMHG | WEIGHT: 141 LBS | OXYGEN SATURATION: 96 % | HEIGHT: 65 IN

## 2024-10-14 DIAGNOSIS — I10 ESSENTIAL HYPERTENSION: ICD-10-CM

## 2024-10-14 DIAGNOSIS — R05.9 COUGH, UNSPECIFIED TYPE: Primary | ICD-10-CM

## 2024-10-14 DIAGNOSIS — R05.9 COUGH: ICD-10-CM

## 2024-10-14 DIAGNOSIS — E03.9 ACQUIRED HYPOTHYROIDISM: ICD-10-CM

## 2024-10-14 DIAGNOSIS — E78.2 MIXED HYPERLIPIDEMIA: ICD-10-CM

## 2024-10-14 LAB
EXPIRATION DATE: NORMAL
FLUAV AG UPPER RESP QL IA.RAPID: NOT DETECTED
FLUBV AG UPPER RESP QL IA.RAPID: NOT DETECTED
INTERNAL CONTROL: NORMAL
Lab: NORMAL
SARS-COV-2 AG UPPER RESP QL IA.RAPID: NOT DETECTED

## 2024-10-14 PROCEDURE — 99214 OFFICE O/P EST MOD 30 MIN: CPT | Performed by: NURSE PRACTITIONER

## 2024-10-14 PROCEDURE — 87428 SARSCOV & INF VIR A&B AG IA: CPT | Performed by: NURSE PRACTITIONER

## 2024-10-14 RX ORDER — ALBUTEROL SULFATE 90 UG/1
2 INHALANT RESPIRATORY (INHALATION) EVERY 6 HOURS PRN
Qty: 8 G | Refills: 1 | Status: SHIPPED | OUTPATIENT
Start: 2024-10-14

## 2024-10-14 RX ORDER — BENZONATATE 200 MG/1
CAPSULE ORAL
Qty: 30 CAPSULE | Refills: 0 | Status: SHIPPED | OUTPATIENT
Start: 2024-10-14

## 2024-10-14 NOTE — PROGRESS NOTES
Chief Complaint  Hypertension (6 month follow up HTN, HLD, and thyroid. Grandson tested postive for covid last week. Had been coughing x 3 days.)    Subjective          Judithjulio Fitzpatrick presents to [facility]    History of Present Illness  Hyperlipidemia  Current medication: crestor   Tolerating medication: Yes  Needs Refill: Yes, [facility]     Lab Results       Component                Value               Date                       CHLPL                    215 (H)             06/07/2024                 TRIG                     97                  06/07/2024                 HDL                      73                  06/07/2024                 LDL                      125 (H)             06/07/2024                Hypertension:  Current medication: HCTZ   Tolerating Medication: Yes  Checking BP at home and it is: not checking, but can   Needs refills: no   Labs:  Lab Results       Component                Value               Date                       GLUCOSE                  115 (H)             06/07/2024                 BUN                      13                  06/07/2024                 CREATININE               0.84                06/07/2024                 EGFRIFNONA               77                  01/03/2022                 EGFRIFAFRI               89                  01/03/2022                 BCR                      15                  06/07/2024                 K                        4.2                 06/07/2024                 CO2                      24                  06/07/2024                 CALCIUM                  9.8                 06/07/2024                 PROTENTOTREF             6.5                 06/07/2024                 ALBUMIN                  4.4                 06/07/2024                 LABIL2                   2.1                 06/07/2024                 AST                      15                  06/07/2024                 ALT                       18                  2024              Hypothyroidism  Current rx: 100 mcg   Tolerating rx: yes on empty stomach  Refills needed , no,  just got her refill yesterday   Lab Results       Component                Value               Date                       TSH                      1.150               2024              URI  When did symptoms started over a week ago in in her sinus. Worse in the last few days with a cough  Any exposures:grandson that lives with her covid + last week   Symptoms: dry cough, occ wheezing, white sinus congestion, but that is better   Treatment tried: out of the tessalon perles, this helped, did try mucinex and tylenol     Past Medical History:  Changes since 2024:        thinks she had covid   GYNECOLOGICAL HISTORY:        PREVENTIVE HEALTH MAINTENANCE         Hepatitis C Medicare Screening: was last done 17; negative      Surgical History:      Biopsy of breast; benign;    Dilation and Curettage  Bilateral Tubal Ligation  BENIGN TUMOR REMOVED FROM NECK;  uterine ablation;   Procedures:  Colonoscopy (  )   heart cath normal 14      Family History:      Positive for Coronary Artery Disease and Hypertension.    Positive for Type 2 Diabetes and Hypothyroidism.    Father:  at age 70's; Cause of death was stomach/colon cancer;  Hypertension;  Type 2 Diabetes   Mother: Hypertension; Hyperlipidemia, liver disorder. Thyroid disorder, Colon cancer    sister: 4, one with pulm fibrosis, MV, thyroid disorder/colon polyps      Social History:     Occupation: Retired (Prior occupation: [employer] )   Marital Status:    Children: 2 children, 3 step-children, and (2 grandchildren living with her) and great grand child and baby's mom                        Past Medical History:   Diagnosis Date    Chronic cough     Hashimoto's disease     Hearing loss     Hypertension     Hypothyroidism     estimated time frame, pt notes was  well after she had her children    Insomnia     Mixed hyperlipidemia     Nicotine dependence 1979    Nodular goiter 2014    noted in outside records but have no documented images showing what is present    Swallowing difficulty 2012    estimated time frame states was main reason had initial thyroid US in '14 but this problem was never addressed or evaluated itself    Tinnitus, left ear        Allergies   Allergen Reactions    Atorvastatin Unknown - High Severity        Past Surgical History:   Procedure Laterality Date    BREAST BIOPSY  2004    benign     CARDIAC CATHETERIZATION      COLONOSCOPY  2012    D & C AND LAPAROSCOPY      DILATION AND CURETTAGE, DIAGNOSTIC / THERAPEUTIC      ENDOMETRIAL ABLATION      EXCISION TUMOR NECK / THORAX  1975    reports was a cyst that was drianed, likely a congenital item    TUBAL ABDOMINAL LIGATION          Social History     Tobacco Use    Smoking status: Every Day     Current packs/day: 1.00     Average packs/day: 1 pack/day for 41.8 years (41.8 ttl pk-yrs)     Types: Cigarettes     Start date: 1983     Passive exposure: Past    Smokeless tobacco: Never   Substance Use Topics    Alcohol use: Not Currently       Family History   Problem Relation Age of Onset    Hypertension Mother     Hyperlipidemia Mother     Stomach cancer Father     Hypertension Father     Diabetes type II Father     Hypothyroidism Other     Coronary artery disease Other     Hypertension Other         Health Maintenance Due   Topic Date Due    Pneumococcal Vaccine 0-64 (1 of 2 - PCV) Never done    ZOSTER VACCINE (1 of 2) Never done    TDAP/TD VACCINES (2 - Td or Tdap) 05/13/2019    COLORECTAL CANCER SCREENING  09/28/2022        Current Outpatient Medications on File Prior to Visit   Medication Sig    hydroCHLOROthiazide 12.5 MG tablet Take 1 tablet by mouth once daily    levothyroxine (SYNTHROID, LEVOTHROID) 100 MCG tablet Take 1 tablet by mouth once daily    rosuvastatin (CRESTOR) 10 MG tablet TAKE 1 TABLET  "BY MOUTH ONCE DAILY AT NIGHT    [DISCONTINUED] benzonatate (TESSALON) 200 MG capsule Take one every 8 hours as needed for cough.    [DISCONTINUED] albuterol sulfate  (90 Base) MCG/ACT inhaler Inhale 2 puffs Every 6 (Six) Hours As Needed for Wheezing. (Patient not taking: Reported on 10/14/2024)     No current facility-administered medications on file prior to visit.       Immunization History   Administered Date(s) Administered    COVID-19 (MODERNA) 1st,2nd,3rd Dose Monovalent 03/23/2021, 04/20/2021    COVID-19 (PFIZER) BIVALENT 12+YRS 10/11/2022    COVID-19 (PFIZER) Purple Cap Monovalent 01/03/2022    Flu Vaccine Intradermal Quad 18-64YR 01/04/2021    Fluzone (or Fluarix & Flulaval for VFC) >6mos 01/03/2022, 10/11/2022, 10/12/2023    Tdap 05/13/2009       Review of Systems   Constitutional:  Negative for fatigue and fever.   HENT:  Positive for congestion.    Respiratory:  Positive for cough and wheezing (occ). Negative for shortness of breath.    Cardiovascular:  Negative for chest pain, palpitations and leg swelling.        Objective     Vitals:    10/14/24 1005   BP: (!) 167/102   BP Location: Left arm   Patient Position: Sitting   Cuff Size: Adult   Pulse: 76   Temp: 98.2 °F (36.8 °C)   TempSrc: Oral   SpO2: 96%   Weight: 64 kg (141 lb)   Height: 165.1 cm (65\")            Physical Exam  Vitals reviewed.   Constitutional:       General: She is not in acute distress.     Appearance: Normal appearance.   HENT:      Right Ear: Tympanic membrane, ear canal and external ear normal.      Left Ear: Tympanic membrane, ear canal and external ear normal.      Nose: Congestion present.      Mouth/Throat:      Pharynx: Oropharynx is clear. No posterior oropharyngeal erythema.   Neck:      Vascular: No carotid bruit.   Cardiovascular:      Rate and Rhythm: Normal rate and regular rhythm.      Heart sounds: Normal heart sounds. No murmur heard.  Pulmonary:      Effort: Pulmonary effort is normal. No respiratory " distress.      Breath sounds: Wheezing (occ inspiratory wheeze heard) present.   Musculoskeletal:      Right lower leg: No edema.      Left lower leg: No edema.   Lymphadenopathy:      Cervical: No cervical adenopathy.   Neurological:      Mental Status: She is alert.   Psychiatric:         Mood and Affect: Mood normal.         Behavior: Behavior normal.         Result Review :     The following data was reviewed by: SANDRO Ram on 10/14/2024:                       Assessment and Plan      Diagnoses and all orders for this visit:    1. Cough, unspecified type (Primary)  Assessment & Plan:  Rest, increase fluids, follow up if symptoms progress or change   Covid screen: neg  Flu screen: neg  To use her inhaler, and mucinex during the day, autumn browne at HS; advised to quit smoking       Orders:  -     POCT SARS-CoV-2 Antigen LATONYA + Flu    2. Essential hypertension  Assessment & Plan:  Bp up, log sheet given,  to monitor BP at home. Continue current med, get labs in the next few weeks, she goes to [facility], orders given. Continue to modify diet and lifestyle.     Orders:  -     Comprehensive Metabolic Panel; Future  -     Lipid Panel; Future    3. Mixed hyperlipidemia  Assessment & Plan:   Continue current medication and efforts with diet and exercise.       Orders:  -     Comprehensive Metabolic Panel; Future  -     Lipid Panel; Future    4. Acquired hypothyroidism  Assessment & Plan:  Will get labs at [facility] in the next few weeks, advised to take rx on empty stomach      Orders:  -     TSH Rfx On Abnormal To Free T4; Future    5. Cough  Assessment & Plan:  Rest, increase fluids, follow up if symptoms progress or change   Covid screen: neg  Flu screen: neg  To use her inhaler, and mucinex during the day, autumn browne at HS; advised to quit smoking       Orders:  -     benzonatate (TESSALON) 200 MG capsule; Take one every 8 hours as needed for cough.  Dispense: 30 capsule; Refill: 0  -      albuterol sulfate  (90 Base) MCG/ACT inhaler; Inhale 2 puffs Every 6 (Six) Hours As Needed for Wheezing.  Dispense: 8 g; Refill: 1        BMI is within normal parameters. No other follow-up for BMI required.         Follow Up     Return if symptoms worsen or fail to improve, for fasting for labs.    Patient was given instructions and counseling regarding her condition or for health maintenance advice. Please see specific information pulled into the AVS if appropriate.

## 2024-10-14 NOTE — ASSESSMENT & PLAN NOTE
Bp up, log sheet given,  to monitor BP at home. Continue current med, get labs in the next few weeks, she goes to [facility], orders given. Continue to modify diet and lifestyle.

## 2024-10-14 NOTE — ASSESSMENT & PLAN NOTE
Rest, increase fluids, follow up if symptoms progress or change   Covid screen: neg  Flu screen: neg  To use her inhaler, and mucinex during the day, autumn browne at HS; advised to quit smoking

## 2024-10-28 DIAGNOSIS — I10 ESSENTIAL HYPERTENSION: ICD-10-CM

## 2024-10-28 RX ORDER — HYDROCHLOROTHIAZIDE 12.5 MG/1
12.5 TABLET ORAL DAILY
Qty: 90 TABLET | Refills: 1 | Status: SHIPPED | OUTPATIENT
Start: 2024-10-28

## 2024-11-07 DIAGNOSIS — E78.2 MIXED HYPERLIPIDEMIA: ICD-10-CM

## 2024-11-07 RX ORDER — ROSUVASTATIN CALCIUM 10 MG/1
TABLET, COATED ORAL
Qty: 90 TABLET | Refills: 0 | Status: SHIPPED | OUTPATIENT
Start: 2024-11-07

## 2024-11-09 LAB
ALBUMIN SERPL-MCNC: 4.4 G/DL (ref 3.9–4.9)
ALP SERPL-CCNC: 81 IU/L (ref 44–121)
ALT SERPL-CCNC: 15 IU/L (ref 0–32)
AMBIG ABBREV CMP14 DEFAULT: NORMAL
AMBIG ABBREV LP DEFAULT: NORMAL
AST SERPL-CCNC: 18 IU/L (ref 0–40)
BILIRUB SERPL-MCNC: 0.2 MG/DL (ref 0–1.2)
BUN SERPL-MCNC: 14 MG/DL (ref 8–27)
BUN/CREAT SERPL: 18 (ref 12–28)
CALCIUM SERPL-MCNC: 9.7 MG/DL (ref 8.7–10.3)
CHLORIDE SERPL-SCNC: 102 MMOL/L (ref 96–106)
CHOLEST SERPL-MCNC: 184 MG/DL (ref 100–199)
CO2 SERPL-SCNC: 27 MMOL/L (ref 20–29)
CREAT SERPL-MCNC: 0.79 MG/DL (ref 0.57–1)
EGFRCR SERPLBLD CKD-EPI 2021: 83 ML/MIN/1.73
GLOBULIN SER CALC-MCNC: 2.6 G/DL (ref 1.5–4.5)
GLUCOSE SERPL-MCNC: 105 MG/DL (ref 70–99)
HDLC SERPL-MCNC: 70 MG/DL
LDLC SERPL CALC-MCNC: 100 MG/DL (ref 0–99)
POTASSIUM SERPL-SCNC: 4.4 MMOL/L (ref 3.5–5.2)
PROT SERPL-MCNC: 7 G/DL (ref 6–8.5)
SODIUM SERPL-SCNC: 141 MMOL/L (ref 134–144)
TRIGL SERPL-MCNC: 79 MG/DL (ref 0–149)
TSH SERPL DL<=0.005 MIU/L-ACNC: 1.16 UIU/ML (ref 0.45–4.5)
VLDLC SERPL CALC-MCNC: 14 MG/DL (ref 5–40)

## 2025-01-01 DIAGNOSIS — E03.9 HYPOTHYROIDISM, UNSPECIFIED TYPE: ICD-10-CM

## 2025-01-02 RX ORDER — LEVOTHYROXINE SODIUM 100 UG/1
100 TABLET ORAL DAILY
Qty: 90 TABLET | Refills: 0 | Status: SHIPPED | OUTPATIENT
Start: 2025-01-02

## 2025-02-01 DIAGNOSIS — E78.2 MIXED HYPERLIPIDEMIA: ICD-10-CM

## 2025-02-03 RX ORDER — ROSUVASTATIN CALCIUM 10 MG/1
TABLET, COATED ORAL
Qty: 90 TABLET | Refills: 0 | Status: SHIPPED | OUTPATIENT
Start: 2025-02-03

## 2025-04-02 DIAGNOSIS — E03.9 HYPOTHYROIDISM, UNSPECIFIED TYPE: ICD-10-CM

## 2025-04-02 RX ORDER — LEVOTHYROXINE SODIUM 100 UG/1
100 TABLET ORAL DAILY
Qty: 90 TABLET | Refills: 0 | Status: SHIPPED | OUTPATIENT
Start: 2025-04-02

## 2025-04-14 ENCOUNTER — OFFICE VISIT (OUTPATIENT)
Dept: FAMILY MEDICINE CLINIC | Age: 65
End: 2025-04-14
Payer: COMMERCIAL

## 2025-04-14 VITALS
TEMPERATURE: 97.9 F | DIASTOLIC BLOOD PRESSURE: 67 MMHG | BODY MASS INDEX: 24.12 KG/M2 | HEIGHT: 65 IN | SYSTOLIC BLOOD PRESSURE: 161 MMHG | OXYGEN SATURATION: 95 % | HEART RATE: 73 BPM | WEIGHT: 144.8 LBS

## 2025-04-14 DIAGNOSIS — I10 ESSENTIAL HYPERTENSION: ICD-10-CM

## 2025-04-14 DIAGNOSIS — E78.2 MIXED HYPERLIPIDEMIA: ICD-10-CM

## 2025-04-14 DIAGNOSIS — Z12.31 SCREENING MAMMOGRAM FOR BREAST CANCER: ICD-10-CM

## 2025-04-14 DIAGNOSIS — F41.9 ANXIETY: ICD-10-CM

## 2025-04-14 DIAGNOSIS — E03.9 ACQUIRED HYPOTHYROIDISM: Primary | ICD-10-CM

## 2025-04-14 DIAGNOSIS — Z80.0 FAMILY HISTORY OF COLON CANCER IN MOTHER: ICD-10-CM

## 2025-04-14 DIAGNOSIS — Z12.11 SCREEN FOR COLON CANCER: ICD-10-CM

## 2025-04-14 DIAGNOSIS — Z00.00 ROUTINE GENERAL MEDICAL EXAMINATION AT A HEALTH CARE FACILITY: ICD-10-CM

## 2025-04-14 PROBLEM — R05.9 COUGH: Status: RESOLVED | Noted: 2021-06-23 | Resolved: 2025-04-14

## 2025-04-14 PROCEDURE — 99396 PREV VISIT EST AGE 40-64: CPT | Performed by: NURSE PRACTITIONER

## 2025-04-14 RX ORDER — ESCITALOPRAM OXALATE 10 MG/1
10 TABLET ORAL DAILY
Qty: 30 TABLET | Refills: 1 | Status: SHIPPED | OUTPATIENT
Start: 2025-04-14

## 2025-04-14 NOTE — ASSESSMENT & PLAN NOTE
Discussed her anxiety, her rx's, will give her a trial of lexapro, to work on life style changes, increase exercise

## 2025-04-14 NOTE — PROGRESS NOTES
Chief Complaint  Annual Exam and follow up     Subjective          Judith Fitzpatrick presents to [facility]    History of Present Illness  General Health Questions  Regular exercise as least 3 days a week: no  Follows a healthy diet: tries   Wears seat belt always: yes   Drinks Alcohol: rarely   Uses Recreational drugs: no  Uses tobacco products: yes, <1ppd  UTD on Tetanus vaccine: 10 years ago   Does BSE: no  Last mammogram: some years ago   Last colon screen:> 10 years ago   Optometry exam: vision first, over a year  Dental exam:  has dentures, does not go   Last pap normal 1-    Hyperlipidemia  Current medication: crestor   Tolerating medication: Yes  Needs Refill: not sure     Lab Results       Component                Value               Date                       CHLPL                    184                 11/08/2024                 TRIG                     79                  11/08/2024                 HDL                      70                  11/08/2024                 LDL                      100 (H)             11/08/2024                Hypertension:  Current medication: HCTZ  Tolerating Medication: Yes  Checking BP at home and it is: not checking, asked about beta blocker  Needs refills: no  Labs:  Lab Results       Component                Value               Date                       GLUCOSE                  105 (H)             11/08/2024                 BUN                      14                  11/08/2024                 CREATININE               0.79                11/08/2024                 EGFRIFNONA               77                  01/03/2022                 EGFRIFAFRI               89                  01/03/2022                 BCR                      18                  11/08/2024                 K                        4.4                 11/08/2024                 CO2                      27                  11/08/2024                 CALCIUM                   9.7                 2024                 ALBUMIN                  4.4                 2024                 AST                      18                  2024                 ALT                      15                  2024              Hypothyroidism  Current rx: levo 100 mcg  Tolerating rx: yes, on empty stomach   Refills needed Yes  Lab Results       Component                Value               Date                       TSH                      1.160               2024              Anxiety/ Depression/ Insomnia  Current medication: none   Stressors: raising kids/grandkids and other family in her home   Current symptoms: anxious  Refills needed[facility]    Past Medical History changes since 10-:        thinks she had covid   GYNECOLOGICAL HISTORY:        PREVENTIVE HEALTH MAINTENANCE       Last pap   Hepatitis C Medicare Screening: was last done 17; negative      Surgical History:      Biopsy of breast; benign;    Dilation and Curettage  Bilateral Tubal Ligation  BENIGN TUMOR REMOVED FROM NECK;  uterine ablation;   Procedures:  Colonoscopy (  )   heart cath normal 14      Family History:      Positive for Coronary Artery Disease and Hypertension.    Positive for Type 2 Diabetes and Hypothyroidism.    Father:  at age 70's; Cause of death was stomach/colon cancer;  Hypertension;  Type 2 Diabetes   Mother: Hypertension; Hyperlipidemia, liver disorder. Thyroid disorder, Colon cancer    sister: 4, one with pulm fibrosis, MV, thyroid disorder/colon polyps, anxiety      Social History:      Occupation: Retired (Prior occupation: [employer] )   Marital Status:    Children: 2 children, 3 step-children, and (2 grandchildren living with her) and great grand child and baby's mom                        Past Medical History:   Diagnosis Date    Chronic cough     Hashimoto's disease     Hearing loss      Hypertension     Hypothyroidism 1990    estimated time frame, pt notes was well after she had her children    Insomnia     Mixed hyperlipidemia     Nicotine dependence 1979    Nodular goiter 2014    noted in outside records but have no documented images showing what is present    Swallowing difficulty 2012    estimated time frame states was main reason had initial thyroid US in '14 but this problem was never addressed or evaluated itself    Tinnitus, left ear        Allergies   Allergen Reactions    Atorvastatin Unknown - High Severity        Past Surgical History:   Procedure Laterality Date    BREAST BIOPSY  2004    benign     CARDIAC CATHETERIZATION      COLONOSCOPY  2012    D & C AND LAPAROSCOPY      DILATION AND CURETTAGE, DIAGNOSTIC / THERAPEUTIC      ENDOMETRIAL ABLATION      EXCISION TUMOR NECK / THORAX  1975    reports was a cyst that was drianed, likely a congenital item    TUBAL ABDOMINAL LIGATION          Social History     Tobacco Use    Smoking status: Every Day     Current packs/day: 1.00     Average packs/day: 1 pack/day for 42.3 years (42.3 ttl pk-yrs)     Types: Cigarettes     Start date: 1983     Passive exposure: Past    Smokeless tobacco: Never   Substance Use Topics    Alcohol use: Not Currently       Family History   Problem Relation Age of Onset    Hypertension Mother     Hyperlipidemia Mother     Stomach cancer Father     Hypertension Father     Diabetes type II Father     Hypothyroidism Other     Coronary artery disease Other     Hypertension Other         Health Maintenance Due   Topic Date Due    Pneumococcal Vaccine 50+ (1 of 2 - PCV) Never done    ZOSTER VACCINE (1 of 2) Never done    MAMMOGRAM  09/24/2011    TDAP/TD VACCINES (2 - Td or Tdap) 05/13/2019    COLORECTAL CANCER SCREENING  09/28/2022    PAP SMEAR  01/12/2024    ANNUAL PHYSICAL  04/11/2025    LUNG CANCER SCREENING  05/24/2025        Current Outpatient Medications on File Prior to Visit   Medication Sig    albuterol sulfate  " (90 Base) MCG/ACT inhaler Inhale 2 puffs Every 6 (Six) Hours As Needed for Wheezing.    benzonatate (TESSALON) 200 MG capsule Take one every 8 hours as needed for cough.    hydroCHLOROthiazide 12.5 MG tablet Take 1 tablet by mouth once daily    levothyroxine (SYNTHROID, LEVOTHROID) 100 MCG tablet Take 1 tablet by mouth once daily    rosuvastatin (CRESTOR) 10 MG tablet TAKE 1 TABLET BY MOUTH ONCE DAILY AT NIGHT     No current facility-administered medications on file prior to visit.       Immunization History   Administered Date(s) Administered    COVID-19 (MODERNA) 1st,2nd,3rd Dose Monovalent 03/23/2021, 04/20/2021    COVID-19 (PFIZER) BIVALENT 12+YRS 10/11/2022    COVID-19 (PFIZER) Purple Cap Monovalent 01/03/2022    Flu Vaccine Intradermal Quad 18-64YR 01/04/2021    Fluzone (or Fluarix & Flulaval for VFC) >6mos 01/03/2022, 10/11/2022, 10/12/2023    Tdap 05/13/2009       Review of Systems   Constitutional:  Negative for fatigue and fever.   HENT:  Negative for ear pain and sore throat.    Eyes:  Negative for blurred vision.   Respiratory:  Negative for cough and shortness of breath.    Cardiovascular:  Negative for chest pain, palpitations and leg swelling.   Gastrointestinal:  Negative for abdominal pain, constipation, diarrhea, nausea and vomiting.   Genitourinary:  Negative for vaginal bleeding.   Musculoskeletal:  Negative for arthralgias and myalgias.   Skin:  Negative for rash.   Neurological:  Negative for dizziness, weakness and headache.   Psychiatric/Behavioral:  Positive for stress. Negative for sleep disturbance and depressed mood.         Objective     Vitals:    04/14/25 1035 04/14/25 1058   BP: 171/100 161/67   BP Location: Right arm    Patient Position: Sitting    Cuff Size: Adult    Pulse: 73    Temp: 97.9 °F (36.6 °C)    TempSrc: Oral    SpO2: 95%    Weight: 65.7 kg (144 lb 12.8 oz)    Height: 165.1 cm (65\")             Physical Exam  Vitals reviewed.   Constitutional:       Appearance: " Normal appearance. She is well-developed.   HENT:      Head: Normocephalic and atraumatic.      Right Ear: External ear normal.      Left Ear: External ear normal.      Mouth/Throat:      Pharynx: No oropharyngeal exudate.   Eyes:      Conjunctiva/sclera: Conjunctivae normal.      Pupils: Pupils are equal, round, and reactive to light.   Cardiovascular:      Rate and Rhythm: Normal rate and regular rhythm.      Heart sounds: No murmur heard.     No friction rub. No gallop.   Pulmonary:      Effort: Pulmonary effort is normal.      Breath sounds: Normal breath sounds. No wheezing or rhonchi.   Abdominal:      General: Bowel sounds are normal. There is no distension.      Palpations: Abdomen is soft.      Tenderness: There is no abdominal tenderness.      Comments:       Skin:     General: Skin is warm and dry.   Neurological:      Mental Status: She is alert and oriented to person, place, and time.      Cranial Nerves: No cranial nerve deficit.   Psychiatric:         Mood and Affect: Mood and affect normal.         Behavior: Behavior normal.         Thought Content: Thought content normal.         Judgment: Judgment normal.         Result Review :     The following data was reviewed by: SANDRO Ram on 04/14/2025:                       Assessment and Plan      Diagnoses and all orders for this visit:    1. Acquired hypothyroidism (Primary)  Assessment & Plan:  She will go to lab [facility] this week for her labs. Pending lab results, if TSH is stable, continue current medication and to follow up in 6 months. Make sure to take medication on an empty stomach and with no other medications.       Orders:  -     TSH Rfx On Abnormal To Free T4; Future    2. Essential hypertension  Assessment & Plan:  Repeat bp slightly improved, will get fasting labs at lab [facility], to check bp, consider adding rx, advised to quit smoking and reduce salt       3. Mixed hyperlipidemia  Assessment & Plan:   Continue current medication  and efforts with diet and exercise.         4. Routine general medical examination at a health care facility  Assessment & Plan:  Advise regular exercise, healthy eating, always wear seat belts.   Immunizations discussed, declines any updates today, advised to check with her pharmacy on Tdap and Shingrex vaccines.   Advised to set up  yearly optometry and dental exams.    Advised to quit smoking.  Offered to do a breast exam and pap smear today, reviewed her chart, she declines today, but will consider.     Orders:  -     Comprehensive Metabolic Panel; Future  -     CBC & Differential; Future  -     Lipid Panel; Future  -     TSH Rfx On Abnormal To Free T4; Future    5. Screen for colon cancer  -     Ambulatory Referral For Screening Colonoscopy    6. Screening mammogram for breast cancer  Assessment & Plan:  Advise to do monthly BSE, declined a breast exam today, advised mammogram, she wants to wait on this       7. Family history of colon cancer in mother  Assessment & Plan:  Ready to get her CLN done, wants to go to [place]     Orders:  -     Ambulatory Referral For Screening Colonoscopy    8. Anxiety  Assessment & Plan:  Discussed her anxiety, her rx's, will give her a trial of lexapro, to work on life style changes, increase exercise     Orders:  -     escitalopram (Lexapro) 10 MG tablet; Take 1 tablet by mouth Daily.  Dispense: 30 tablet; Refill: 1        BMI is within normal parameters. No other follow-up for BMI required.         Follow Up     Return if symptoms worsen or fail to improve, for follow up pending labs done at [facility] this week .    Patient was given instructions and counseling regarding her condition or for health maintenance advice. Please see specific information pulled into the AVS if appropriate.

## 2025-04-14 NOTE — ASSESSMENT & PLAN NOTE
Advise to do monthly BSE, declined a breast exam today, advised mammogram, she wants to wait on this

## 2025-04-14 NOTE — ASSESSMENT & PLAN NOTE
Advise regular exercise, healthy eating, always wear seat belts.   Immunizations discussed, declines any updates today, advised to check with her pharmacy on Tdap and Shingrex vaccines.   Advised to set up  yearly optometry and dental exams.    Advised to quit smoking.  Offered to do a breast exam and pap smear today, reviewed her chart, she declines today, but will consider.

## 2025-04-14 NOTE — ASSESSMENT & PLAN NOTE
She will go to [facility] this week for her labs. Pending lab results, if TSH is stable, continue current medication and to follow up in 6 months. Make sure to take medication on an empty stomach and with no other medications.

## 2025-04-14 NOTE — ASSESSMENT & PLAN NOTE
Repeat bp slightly improved, will get fasting labs at [facility], to check bp, consider adding rx, advised to quit smoking and reduce salt

## 2025-04-15 ENCOUNTER — RESULTS FOLLOW-UP (OUTPATIENT)
Dept: FAMILY MEDICINE CLINIC | Age: 65
End: 2025-04-15
Payer: COMMERCIAL

## 2025-04-15 DIAGNOSIS — E03.9 HYPOTHYROIDISM, UNSPECIFIED TYPE: ICD-10-CM

## 2025-04-15 LAB
ALBUMIN SERPL-MCNC: 4.7 G/DL (ref 3.9–4.9)
ALP SERPL-CCNC: 79 IU/L (ref 44–121)
ALT SERPL-CCNC: 12 IU/L (ref 0–32)
AMBIG ABBREV CMP14 DEFAULT: NORMAL
AMBIG ABBREV LP DEFAULT: NORMAL
AST SERPL-CCNC: 18 IU/L (ref 0–40)
BASOPHILS # BLD AUTO: 0 X10E3/UL (ref 0–0.2)
BASOPHILS NFR BLD AUTO: 1 %
BILIRUB SERPL-MCNC: 0.3 MG/DL (ref 0–1.2)
BUN SERPL-MCNC: 12 MG/DL (ref 8–27)
BUN/CREAT SERPL: 15 (ref 12–28)
CALCIUM SERPL-MCNC: 9.5 MG/DL (ref 8.7–10.3)
CHLORIDE SERPL-SCNC: 102 MMOL/L (ref 96–106)
CHOLEST SERPL-MCNC: 181 MG/DL (ref 100–199)
CO2 SERPL-SCNC: 22 MMOL/L (ref 20–29)
CREAT SERPL-MCNC: 0.78 MG/DL (ref 0.57–1)
EGFRCR SERPLBLD CKD-EPI 2021: 85 ML/MIN/1.73
EOSINOPHIL # BLD AUTO: 0.1 X10E3/UL (ref 0–0.4)
EOSINOPHIL NFR BLD AUTO: 2 %
ERYTHROCYTE [DISTWIDTH] IN BLOOD BY AUTOMATED COUNT: 12.7 % (ref 11.7–15.4)
GLOBULIN SER CALC-MCNC: 2.2 G/DL (ref 1.5–4.5)
GLUCOSE SERPL-MCNC: 92 MG/DL (ref 70–99)
HCT VFR BLD AUTO: 42.9 % (ref 34–46.6)
HDLC SERPL-MCNC: 63 MG/DL
HGB BLD-MCNC: 14.3 G/DL (ref 11.1–15.9)
IMM GRANULOCYTES # BLD AUTO: 0 X10E3/UL (ref 0–0.1)
IMM GRANULOCYTES NFR BLD AUTO: 0 %
LDLC SERPL CALC-MCNC: 92 MG/DL (ref 0–99)
LYMPHOCYTES # BLD AUTO: 2.3 X10E3/UL (ref 0.7–3.1)
LYMPHOCYTES NFR BLD AUTO: 29 %
MCH RBC QN AUTO: 28.7 PG (ref 26.6–33)
MCHC RBC AUTO-ENTMCNC: 33.3 G/DL (ref 31.5–35.7)
MCV RBC AUTO: 86 FL (ref 79–97)
MONOCYTES # BLD AUTO: 0.7 X10E3/UL (ref 0.1–0.9)
MONOCYTES NFR BLD AUTO: 9 %
NEUTROPHILS # BLD AUTO: 4.8 X10E3/UL (ref 1.4–7)
NEUTROPHILS NFR BLD AUTO: 59 %
PLATELET # BLD AUTO: 321 X10E3/UL (ref 150–450)
POTASSIUM SERPL-SCNC: 3.8 MMOL/L (ref 3.5–5.2)
PROT SERPL-MCNC: 6.9 G/DL (ref 6–8.5)
RBC # BLD AUTO: 4.98 X10E6/UL (ref 3.77–5.28)
SODIUM SERPL-SCNC: 138 MMOL/L (ref 134–144)
TRIGL SERPL-MCNC: 149 MG/DL (ref 0–149)
TSH SERPL DL<=0.005 MIU/L-ACNC: 2.61 UIU/ML (ref 0.45–4.5)
VLDLC SERPL CALC-MCNC: 26 MG/DL (ref 5–40)
WBC # BLD AUTO: 8.1 X10E3/UL (ref 3.4–10.8)

## 2025-04-15 RX ORDER — LEVOTHYROXINE SODIUM 100 UG/1
100 TABLET ORAL DAILY
Qty: 90 TABLET | Refills: 0 | Status: SHIPPED | OUTPATIENT
Start: 2025-04-15

## 2025-04-15 NOTE — LETTER
Judith Fitzpatrick  [address]  [place] [address]    April 21, 2025     Judith,     Your labs are all normal. I sent in your refill for your thyroid medication.  Call or follow up if you have any questions.       Resulted Orders   CBC & Differential   Result Value Ref Range    WBC 8.1 3.4 - 10.8 x10E3/uL    RBC 4.98 3.77 - 5.28 x10E6/uL    Hemoglobin 14.3 11.1 - 15.9 g/dL    Hematocrit 42.9 34.0 - 46.6 %    MCV 86 79 - 97 fL    MCH 28.7 26.6 - 33.0 pg    MCHC 33.3 31.5 - 35.7 g/dL    RDW 12.7 11.7 - 15.4 %    Platelets 321 150 - 450 x10E3/uL    Neutrophil Rel % 59 Not Estab. %    Lymphocyte Rel % 29 Not Estab. %    Monocyte Rel % 9 Not Estab. %    Eosinophil Rel % 2 Not Estab. %    Basophil Rel % 1 Not Estab. %    Neutrophils Absolute 4.8 1.4 - 7.0 x10E3/uL    Lymphocytes Absolute 2.3 0.7 - 3.1 x10E3/uL    Monocytes Absolute 0.7 0.1 - 0.9 x10E3/uL    Eosinophils Absolute 0.1 0.0 - 0.4 x10E3/uL    Basophils Absolute 0.0 0.0 - 0.2 x10E3/uL    Immature Granulocyte Rel % 0 Not Estab. %    Immature Grans Absolute 0.0 0.0 - 0.1 x10E3/uL   Comprehensive Metabolic Panel   Result Value Ref Range    Glucose 92 70 - 99 mg/dL    BUN 12 8 - 27 mg/dL    Creatinine 0.78 0.57 - 1.00 mg/dL    EGFR Result 85 >59 mL/min/1.73    BUN/Creatinine Ratio 15 12 - 28    Sodium 138 134 - 144 mmol/L    Potassium 3.8 3.5 - 5.2 mmol/L    Chloride 102 96 - 106 mmol/L    Total CO2 22 20 - 29 mmol/L    Calcium 9.5 8.7 - 10.3 mg/dL    Total Protein 6.9 6.0 - 8.5 g/dL    Albumin 4.7 3.9 - 4.9 g/dL    Globulin 2.2 1.5 - 4.5 g/dL    Total Bilirubin 0.3 0.0 - 1.2 mg/dL    Alkaline Phosphatase 79 44 - 121 IU/L    AST (SGOT) 18 0 - 40 IU/L    ALT (SGPT) 12 0 - 32 IU/L   Lipid Panel   Result Value Ref Range    Total Cholesterol 181 100 - 199 mg/dL    Triglycerides 149 0 - 149 mg/dL    HDL Cholesterol 63 >39 mg/dL    VLDL Cholesterol Luke 26 5 - 40 mg/dL    LDL Chol Calc (NIH) 92 0 - 99 mg/dL   TSH Rfx On Abnormal To Free T4   Result Value  Ref Range    TSH 2.610 0.450 - 4.500 uIU/mL   Ambig Abbrev CMP14 Default   Result Value Ref Range    Ambig Abbrev CMP14 Default Comment       Comment:      A hand-written panel/profile was received from your office. In  accordance with the [facility] Ambiguous Test Code Policy dated July 2003, we have completed your order by using the closest currently  or formerly recognized AMA panel.  We have assigned Comprehensive  Metabolic Panel (14), Test Code #371833 to this request.  If this  is not the testing you wished to receive on this specimen, please  contact the [facility] Client Inquiry/Technical Services Department  to clarify the test order.  We appreciate your business.     Ambig Abbrev LP Default   Result Value Ref Range    Ambig Abbrev LP Default Comment       Comment:      A hand-written panel/profile was received from your office. In  accordance with the [facility] Ambiguous Test Code Policy dated July 2003, we have completed your order by using the closest currently  or formerly recognized AMA panel.  We have assigned Lipid Panel,  Test Code #[id number] to this request. If this is not the testing you  wished to receive on this specimen, please contact the [facility]  Client Inquiry/Technical Services Department to clarify the test  order.  We appreciate your business.         Sincerely,      SANDRO Ram

## 2025-05-16 ENCOUNTER — PREP FOR SURGERY (OUTPATIENT)
Dept: OTHER | Facility: HOSPITAL | Age: 65
End: 2025-05-16
Payer: COMMERCIAL

## 2025-05-16 DIAGNOSIS — Z80.0 FAMILY HISTORY OF COLON CANCER: Primary | ICD-10-CM

## 2025-05-25 DIAGNOSIS — E78.2 MIXED HYPERLIPIDEMIA: ICD-10-CM

## 2025-05-27 RX ORDER — ROSUVASTATIN CALCIUM 10 MG/1
10 TABLET, COATED ORAL NIGHTLY
Qty: 90 TABLET | Refills: 1 | Status: SHIPPED | OUTPATIENT
Start: 2025-05-27

## 2025-05-28 DIAGNOSIS — I10 ESSENTIAL HYPERTENSION: ICD-10-CM

## 2025-05-28 RX ORDER — HYDROCHLOROTHIAZIDE 12.5 MG/1
12.5 TABLET ORAL DAILY
Qty: 90 TABLET | Refills: 0 | Status: SHIPPED | OUTPATIENT
Start: 2025-05-28

## 2025-06-11 DIAGNOSIS — F41.9 ANXIETY: ICD-10-CM

## 2025-06-11 RX ORDER — ESCITALOPRAM OXALATE 10 MG/1
10 TABLET ORAL DAILY
Qty: 90 TABLET | Refills: 0 | Status: SHIPPED | OUTPATIENT
Start: 2025-06-11